# Patient Record
Sex: FEMALE | Race: BLACK OR AFRICAN AMERICAN | NOT HISPANIC OR LATINO | URBAN - METROPOLITAN AREA
[De-identification: names, ages, dates, MRNs, and addresses within clinical notes are randomized per-mention and may not be internally consistent; named-entity substitution may affect disease eponyms.]

---

## 2017-06-23 ENCOUNTER — LAB REQUISITION (OUTPATIENT)
Dept: LAB | Facility: HOSPITAL | Age: 77
End: 2017-06-23
Payer: MEDICARE

## 2017-06-23 ENCOUNTER — ALLSCRIPTS OFFICE VISIT (OUTPATIENT)
Dept: OTHER | Facility: OTHER | Age: 77
End: 2017-06-23

## 2017-06-23 DIAGNOSIS — R21 RASH AND OTHER NONSPECIFIC SKIN ERUPTION: ICD-10-CM

## 2017-06-23 PROCEDURE — 88341 IMHCHEM/IMCYTCHM EA ADD ANTB: CPT | Performed by: DERMATOLOGY

## 2017-06-23 PROCEDURE — 81342 TRG GENE REARRANGEMENT ANAL: CPT | Performed by: DERMATOLOGY

## 2017-06-23 PROCEDURE — 88342 IMHCHEM/IMCYTCHM 1ST ANTB: CPT | Performed by: DERMATOLOGY

## 2017-06-23 PROCEDURE — 88305 TISSUE EXAM BY PATHOLOGIST: CPT | Performed by: DERMATOLOGY

## 2017-06-23 PROCEDURE — 88312 SPECIAL STAINS GROUP 1: CPT | Performed by: DERMATOLOGY

## 2017-07-14 ENCOUNTER — GENERIC CONVERSION - ENCOUNTER (OUTPATIENT)
Dept: OTHER | Facility: OTHER | Age: 77
End: 2017-07-14

## 2017-08-04 ENCOUNTER — ALLSCRIPTS OFFICE VISIT (OUTPATIENT)
Dept: OTHER | Facility: OTHER | Age: 77
End: 2017-08-04

## 2017-08-21 ENCOUNTER — GENERIC CONVERSION - ENCOUNTER (OUTPATIENT)
Dept: OTHER | Facility: OTHER | Age: 77
End: 2017-08-21

## 2018-01-15 NOTE — RESULT NOTES
Verified Results  (1) TISSUE EXAM [id number] 12:41PM [facility]ia Order Number: [id number]     Test Name Result Flag Reference   LAB AP CASE REPORT (Report)     Surgical Pathology Report             Case: [id number]                   Authorizing Provider: Laney Chu MD     Collected:      06/23/2017 1241        Pathologist:      Nori Kent MD      Received:      06/26/2017 5324        Specimen:  Skin, Other, Left thigh   LAB AP FINAL DIAGNOSIS (Report)     A  Skin, Left thigh, punch biopsy:  - Mild superficial perivascular and interstitial lymphocytic infiltrate   with rare    eosinophils  See Note  -- No significant epidermotropism, tagging or microabscesses  -- T-cell gene rearrangement pending; results will be reported in an   addendum    - Melanin incontinence and basilar hyperpigmentation consistent with    post-inflammatory hyperpigmentation    - Special stain for fungus (PAS) negative  - Small incidental epidermal (infundibular) cyst       Interpretation performed at [facility], [address] [place] [address]  Electronically signed by Nori Kent MD on 7/2/2017 at 7:36 PM   LAB AP NOTE (Report)     Multiple levels are examined revealing a mild superficial perivascular and   interstitial inflammatory infiltrate composed of predominantly small   mature non-atypical lymphocytes and rare eosinophils  The epidermis is   slightly acanthotic with hyperkeratosis and minimal spongiosis  Epidermotropism, microabscesses and tagging are not seen  No necrotic   keratinocytes are appreciated  There is melanin incontinence and basilar   hyperpigmentation consistent with post-inflammatory hyperpigmentation  An   incidental small epidermal cyst is seen  Special stain for fungus is   negative   Because of the clinical concern for T-cell lymphoma,   immunostains are performed with appropriate controls revealing the   following lymphocyte staining:  CD3 predominant T-cell infiltrate; CD4>CD8 (3:1); subset of CD5 and CD7   positive T-cells; Few CD20 positive B-cells;  CD56 and CD30 negative;  CD2 and T-cell gene rearrangement pending; results will be reported in an   addendum  The histologic findings are non-specific, but given the mixed cell   infiltrate, a chronic eczematous dermatitis is favored over an evolving   T-cell lymphoproliferative disorder  To completely exclude a T-cell   lymphoproliferative disorder, T-cell gene rearrangement is pending  The   results will be reported in an addendum  LAB AP SURGICAL ADDITIONAL INFORMATION (Report)     These tests were developed and their performance characteristics   determined by [facility] or [facility]  They may not be cleared or approved by the [place]  Food and   Drug Administration  The FDA has determined that such clearance or   approval is not necessary  These tests are used for clinical purposes  They should not be regarded as investigational or for research  This   laboratory has been approved by [place] [address], designated as a high-complexity   laboratory and is qualified to perform these tests  LAB AP GROSS DESCRIPTION (Report)     A  The specimen is received in formalin, labeled with the patient's name   and hospital number, and is designated left thigh  The specimen consists   of a tan portion of skin measuring 0 4 ck with attached underlying soft   tissue to a depth of 0 3 cm  No distinct lesion is identified grossly  The apparent margin of resection is painted with green ink  The specimen   is bisected and entirely submitted in 1 cassette  Note: The estimated total formalin fixation time based upon information   provided by the submitting clinician and the standard processing schedule   is over 72 hours   AEK   LAB AP CLINICAL INFORMATION      TW Order Number: [id number]  R/O T-cell lymphoma   LAB AP ADDENDUM 1 (Report)     CD2 immunostain is performed with appropriate controls and reveals a   subset of positive T-cells  T-cell Gamma Gene Rearrangement by PCR   performed by [facility] and interpreted by Dr Penelope Burns confirms no clonal   T-cell gene rearrangement  Clinical correlation is required  If there is   continued suspicion for cutaneous T-cell lymphoma or lesions   change/progress, additional biopsy with flow cytometry should be   considered  A copy of the addendum is faxed to Dr Duane Muslim on 7/12/17   1820 hours  The complete [facility] report follows below:    T-CELL GAMMA GENE REARRANGEMENT BY PCR  Results:  T-CELL GENE REARRANGEMENT: NEGATIVE - NO CLONAL T-CELL GENE REARRANGEMENT   IS DETECTED  RESULTS  Rearrangement of the T-cell receptor gamma (TCR?? ) gene is most often   analyzed to assess clonality in T-cell lymphoproliferations  due to its relative structural simplicity  The TCR?? chain gene is located   on the short arm of chromosome 7 and has 2 constant (C), 5  joining (J), and 14 variable region (V) segments  Of these variable   regions, 11 are functional and can be rearranged in T-cell  lymphomas  This assay contains three 5? ?? primers  One amplifies DNA in the V?? family   I, which includes segments for V??2, V??3, V??4, V??5, V????7,  and V??8, also termed V??1-8  This family comprises the majority (up to 70%)   of rearranged TCR-?? chain genes  Another targets V??9  from the V?? family II (representing up to 15% of rearranged TCR-?? chain   genes), and the other primer amplifies the V??10 locus from  the V?? family III (representing up to 6% of rearranged TCR-?? chain genes)  The 3 ? ? ?primers amplify joining regions exons J1 and J2,  which are highly homologous and seen in up to 90% of rearranged TCR-??   chain genes  Collectively, this multiplexed PCR assay  assesses 90% of the V regions commonly rearranged in T-cell   lymphoproliferations  The lower limit of detection of this assay for  fresh samples is 2% for V??1-8, 1% for V??9, and 2% for V??10   The lower limit of detection of this assay for formalin-fixed, paraffinembedded  tissue is 10% for V??1-8 and 3% for V??10  A clonal T-cell population is not synonymous with a T-cell   lymphoproliferative disorder  Clonotypic expansion may occur in a variety  of neoplastic and non-neoplastic conditions, including viral infections   (EBV-related or other) autoimmune disorders, immune  deficiency syndromes, and in stem cell transplant patients  Conversely, false negatives may occur from unusual V?? family IV   rearrangements, or in small clonal T-cell populations present  below the lower limit of detection of this assay  Therefore, this result   should be interpreted in the context of all other laboratory,  histologic, and clinical information  INTERPRETIVE INFORMATION  Genomic DNA was extracted using the QIAamp DNA Kits ([employer], [facility], [place])   and amplified by fluorescent polymerase chain  reaction (PCR)  ?? ???actin serves as the internal amplification control,   confirming adequate DNA in the sample  Amplicons were   by capillary electrophoresis and detected fluorescently with an   Judys Book1 [facility] Genetic Analyzer [place], [place])  The collected data from the NITISH 3130 were analyzed by   [employer] [place], [place])  METHODOLOGY  REFERENCES  Salomon COBB, Roberto Tran, et al  Laboratory Strategies for   Efficient Handling of Paraffin-Embedded Tissues for  Molecular Detection of Clonality in Non-Hodgkin Lymphomas  Diagnostic   Molecular Pathology 12(2):79-87,2003  1   Agapito Mcmillan, et al  The Distribution of Gene Segments in   T-Cell Receptor ? ? Gene Rearrangements  Demonstrates the Need for Multiple Primers Sets  Journal of Molecular   Diagnostics 5(2):82-87, 2003  2   Kiki Hale, Rickey A  A PCR Assay for Detecting   Rearrangement of the TCR-?? gene  Molecular Diagnosis  6(2):117-124, 2001    Mitra Parkinson AW, Lam SHABAZZ, et al  Design and   standardization of PCR primers and protocols for detection of  clonal immunoglobulin and T-cell receptor gene recombinations in suspect   lymphoproliferations: report of the [facility]-2  Concerted Action [id number]  Leukemia 1712):2257-317, 2003   4   * See prior cases ([id number], 6/30/17 to [id number], 7/5/17)    MELE Bowling , M S    Hematopathologist  Addendum electronically signed by Roni Hanna MD on 7/12/2017 at 6:17 PM

## 2019-03-26 ENCOUNTER — TRANSCRIBE ORDERS (OUTPATIENT)
Dept: NON INVASIVE DIAGNOSTICS | Facility: CLINIC | Age: 79
End: 2019-03-26

## 2019-03-26 DIAGNOSIS — I10 HYPERTENSION: ICD-10-CM

## 2019-03-26 DIAGNOSIS — E78.5 HYPERLIPEMIA: Primary | ICD-10-CM

## 2019-04-08 ENCOUNTER — HOSPITAL ENCOUNTER (OUTPATIENT)
Dept: NON INVASIVE DIAGNOSTICS | Facility: CLINIC | Age: 79
Discharge: HOME | End: 2019-04-08
Payer: MEDICARE

## 2019-04-08 DIAGNOSIS — E78.5 HYPERLIPEMIA: ICD-10-CM

## 2019-04-08 DIAGNOSIS — I10 HYPERTENSION: ICD-10-CM

## 2019-04-08 PROCEDURE — 93306 TTE W/DOPPLER COMPLETE: CPT | Performed by: INTERNAL MEDICINE

## 2019-04-08 PROCEDURE — 93306 TTE W/DOPPLER COMPLETE: CPT

## 2022-03-07 ENCOUNTER — NURSING HOME VISIT (OUTPATIENT)
Dept: GERIATRICS | Facility: OTHER | Age: 82
End: 2022-03-07
Payer: MEDICARE

## 2022-03-07 ENCOUNTER — NURSING HOME VISIT (OUTPATIENT)
Dept: GERIATRICS | Facility: OTHER | Age: 82
End: 2022-03-07

## 2022-03-07 DIAGNOSIS — L98.9 SKIN LESIONS: ICD-10-CM

## 2022-03-07 DIAGNOSIS — I10 PRIMARY HYPERTENSION: ICD-10-CM

## 2022-03-07 DIAGNOSIS — B00.9 HERPETIC LESIONS: Primary | ICD-10-CM

## 2022-03-07 DIAGNOSIS — M06.09 RHEUMATOID ARTHRITIS OF MULTIPLE SITES WITH NEGATIVE RHEUMATOID FACTOR (HCC): ICD-10-CM

## 2022-03-07 DIAGNOSIS — E03.9 ACQUIRED HYPOTHYROIDISM: ICD-10-CM

## 2022-03-07 DIAGNOSIS — I26.99 PE (PULMONARY THROMBOEMBOLISM) (HCC): Primary | ICD-10-CM

## 2022-03-07 DIAGNOSIS — R26.2 AMBULATORY DYSFUNCTION: ICD-10-CM

## 2022-03-07 PROCEDURE — 99306 1ST NF CARE HIGH MDM 50: CPT | Performed by: FAMILY MEDICINE

## 2022-03-07 PROCEDURE — 99307 SBSQ NF CARE SF MDM 10: CPT | Performed by: NURSE PRACTITIONER

## 2022-03-07 RX ORDER — LOSARTAN POTASSIUM 50 MG/1
50 TABLET ORAL DAILY
COMMUNITY

## 2022-03-07 RX ORDER — SULFASALAZINE 500 MG/1
500 TABLET ORAL 3 TIMES DAILY
COMMUNITY

## 2022-03-07 RX ORDER — FOLIC ACID 1 MG/1
1 TABLET ORAL DAILY
COMMUNITY

## 2022-03-07 RX ORDER — PRAVASTATIN SODIUM 40 MG
40 TABLET ORAL DAILY
COMMUNITY

## 2022-03-07 RX ORDER — PANTOPRAZOLE SODIUM 40 MG/1
40 TABLET, DELAYED RELEASE ORAL DAILY
COMMUNITY

## 2022-03-07 RX ORDER — GABAPENTIN 300 MG/1
300 CAPSULE ORAL 2 TIMES DAILY
COMMUNITY

## 2022-03-07 RX ORDER — LEVOTHYROXINE SODIUM 0.03 MG/1
25 TABLET ORAL DAILY
COMMUNITY

## 2022-03-07 NOTE — ASSESSMENT & PLAN NOTE
· Herpetic lesions noted on left buttock  · Small clusters of closed fluid filled blisters noted  · Patient states lesions are not painful, but burn and itch    · Patient reports she has had these x 2 weeks  · Will start Acyclovir topical

## 2022-03-07 NOTE — PROGRESS NOTES
[facility]  [address] [place] [address]  [id number]  [phone] [facility]   Pos [address]  Progress Note        NAME: Ernestine Diggs  AGE: [de-identified] y o  SEX: female  :  1940  DATE OF ENCOUNTER: 3/7/2022    Chief Complaint   Patient seen and examined for follow up on chronic conditions  History of Present Illness     Ernestine Bunch is an [de-identified] y o  female patient of [facility] rehab with acute and chronic medical conditions of rheumatoid arthritis of both knees, hyperlipidemia, hypertension, and ambulatory dysfunction  The patient is being seen and examined today by request of nursing for blisters on her left buttock  Upon examination, the patient is sitting in her recliner, alert, cooperative, and in no acute distress  She denies pain, sob, chest pain, abdominal pain, fever, chills, nausea/vomiting, diarrhea/constipation, or dysuria  The patient reports she has a good appetite and is drinking an adequate amount of fluids  The patient's only complaint today is painful and burning in her left buttock x 2 weeks, while she was hospitalized  PT/OT services have evaluated and began working with patient for rehab and restorative services  The following portions of the patient's history were reviewed and updated as appropriate: allergies, current medications, past family history, past medical history, past social history, past surgical history and problem list     Review of Systems     A review of systems was performed  All negative, except as per HPI  History     Past Medical History:   Diagnosis Date    Hypertension      No past surgical history on file  No family history on file    Social History     Socioeconomic History    Marital status:      Spouse name: Not on file    Number of children: Not on file    Years of education: Not on file    Highest education level: Not on file   Occupational History    Not on file   Tobacco Use    Smoking status: Not on file    Smokeless tobacco: Not on file   Substance and Sexual Activity    Alcohol use: Not on file    Drug use: Not on file    Sexual activity: Not on file   Other Topics Concern    Not on file   Social History Narrative    Not on file     Social Determinants of Health     Financial Resource Strain: Not on file   Food Insecurity: Not on file   Transportation Needs: Not on file   Physical Activity: Not on file   Stress: Not on file   Social Connections: Not on file   Intimate Partner Violence: Not on file   Housing Stability: Not on file     Allergies   Allergen Reactions    Aceinhibitors [Ace Inhibitors] Other (See Comments)        Atenolol Other (See Comments)       Objective     Vital Signs  BP: 146/66       HR:61 T:97 4    RR:20 O2Sat:97% W:140 8  General: NAD, Well Nourished, Well Developed  Oral: Oropharynx Moist and Clear  Neck: Supple, +ROM  CV: S1, S2, normal rate, regular rhythm, no murmur appreciated  Pulmonary: Lung sounds clear to air, no wheezing, rhonchi, rales  Abdominal:BS + x4 in all quadrants, soft, no mass, no tenderness  Extremities: BLE +lymphedema, + BLE limited ROM, +Weakness  Skin: Warm, Dry, Left buttock clustered/scattered/ blistered lesions, no rash, no erythema present, no ecchymosis present  Neurological: CN 2-12 intact, PERRLA  Psych: Alert and oriented times 3, pleasant mood, no affect, good judgement    Pertinent Laboratory/Diagnostic Studies:  N/a      Current Medications     Current Medications Reviewed and updated in Nursing Home EMR  Assessment and Plan     Herpetic lesions  · Herpetic lesions noted on left buttock  · Small clusters of closed fluid filled blisters noted  · Patient states lesions are not painful, but burn and itch    · Patient reports she has had these x 2 weeks  · Will start Acyclovir topical       [address]  3/7/2022

## 2022-03-08 PROBLEM — L98.9 SKIN LESIONS: Status: ACTIVE | Noted: 2022-03-08

## 2022-03-08 NOTE — PROGRESS NOTES
[facility]  [address] [place] [address] SNF [facility]  History and Physical    NAME: Ernestine Diggs  AGE: [de-identified] y o  SEX: female [id number]    DATE OF ENCOUNTER: 3/8/2022    Code status:  CPR    Assessment and Plan     1  PE (pulmonary thromboembolism) (HCC)  - cont Eliquis 5 mg po bid    2  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)  - cont Methotrexate 2 5 mg po 8 tabs qwk  - cont Salfasalazine 500 mg po tid  - complete prednisone taper  - cont Tylenol as needed  - cont Gabapentin 300 mg po bid    3  Ambulatory dysfunction  - PT/OT ordered  - Fall precautions in place    4  Acquired hypothyroidism  - cont Levothyroxine 25 mcg po qam    5  Primary hypertension  - cont Losartan 50 mg po qd    6  Skin lesions  - appears to be molluscum contagiosum  - start Imiquimod topical 5% tid    7  Constipation:  - start dulcolax 10 mg po qd (home med)    All medications and routine orders were reviewed and updated as needed  Plan discussed with: patient and staff    Chief Complaint     Seen for admission at [address] [place] [address], a [de-identified] y/o female with PMH of HTN, HLD, Hypothyroidism, RA, Polyarthropathy, Anemia, PE on Eliquis got admitted to [facility] for pain and stiffness in both knees due to RA exacerbation  She was treated with prednisone along with methotrexate and salfasalazine  CT chest was done, which showed PE, but LE doppler was negative  She was started on Eliquis  Physical therapy evaluated and recommended rehab  She got discharged to [facility] for subacute rehab  She was seen and examined at bedside, stable  She is able to give good history  She lives at home with son and daughter in law  She uses walker at baseline  She need assistance with IADLs and some ADLs  She has Rh arthritis for years and is progressively getting worse, mostly in her knees  Says her pain is much better now, able to participate in PT   She is c/o constipation  She has a rash for 2 weeks on her buttocks, had this sometime before  The rash is not painful or itchy  Staff have no concerns at this time  HISTORY:  Past Medical History:   Diagnosis Date    Hypertension      History reviewed  No pertinent family history  Social History     Socioeconomic History    Marital status:      Spouse name: None    Number of children: None    Years of education: None    Highest education level: None   Occupational History    None   Tobacco Use    Smoking status: None    Smokeless tobacco: None   Substance and Sexual Activity    Alcohol use: None    Drug use: None    Sexual activity: None   Other Topics Concern    None   Social History Narrative    None     Social Determinants of Health     Financial Resource Strain: Not on file   Food Insecurity: Not on file   Transportation Needs: Not on file   Physical Activity: Not on file   Stress: Not on file   Social Connections: Not on file   Intimate Partner Violence: Not on file   Housing Stability: Not on file       Allergies: Allergies   Allergen Reactions    Aceinhibitors [Ace Inhibitors] Other (See Comments)        Atenolol Other (See Comments)       Review of Systems     Review of Systems   Constitutional: Positive for activity change and fatigue  Negative for fever  HENT: Positive for hearing loss and sinus pressure  Negative for dental problem and trouble swallowing  Eyes: Negative for photophobia and visual disturbance  Respiratory: Negative for cough and shortness of breath  Cardiovascular: Negative for chest pain, palpitations and leg swelling  Gastrointestinal: Positive for constipation  Negative for abdominal pain, diarrhea, nausea and vomiting  Genitourinary: Negative for difficulty urinating and dysuria  Musculoskeletal: Positive for arthralgias and gait problem  Skin: Positive for rash  Neurological: Positive for weakness   Negative for dizziness and headaches  All other systems reviewed and are negative  As in HPI  Medications and orders     All medications reviewed and updated in Nursing Home EMR  Objective     Vitals: T: 97 6, P: 66, R: 16, BP: 115/62, 98% on RA, Wt: 140 8 lbs    Physical Exam  Vitals and nursing note reviewed  Constitutional:       General: She is not in acute distress  Appearance: Normal appearance  She is well-developed  She is not diaphoretic  HENT:      Head: Normocephalic and atraumatic  Nose: Nose normal       Mouth/Throat:      Mouth: Mucous membranes are moist       Pharynx: Oropharynx is clear  No oropharyngeal exudate  Eyes:      General: No scleral icterus  Right eye: No discharge  Left eye: No discharge  Extraocular Movements: Extraocular movements intact  Conjunctiva/sclera: Conjunctivae normal    Cardiovascular:      Rate and Rhythm: Normal rate and regular rhythm  Heart sounds: Normal heart sounds  No murmur heard  Pulmonary:      Effort: Pulmonary effort is normal  No respiratory distress  Breath sounds: Normal breath sounds  No wheezing  Chest:      Chest wall: No tenderness  Abdominal:      General: Bowel sounds are normal       Palpations: Abdomen is soft  Tenderness: There is no abdominal tenderness  There is no guarding or rebound  Musculoskeletal:         General: No tenderness or deformity  Normal range of motion  Cervical back: Normal range of motion and neck supple  Right lower leg: Edema present  Left lower leg: Edema present  Skin:     General: Skin is warm and dry  Comments: Hard bumps on left buttock   Neurological:      Mental Status: She is alert and oriented to person, place, and time  Cranial Nerves: No cranial nerve deficit  Psychiatric:         Mood and Affect: Mood normal          Behavior: Behavior normal          Pertinent Laboratory/Diagnostic Studies:    The following labs/studies were reviewed please see chart or hospital paperwork for details  Ref Range & Units 2/27/22 1240    Hemoglobin 11 5 - 14 5 g/dL 11 1 Low     Hematocrit 35 0 - 43 0 % 32 8 Low     WBC 4 0 - 10 0 thou/cmm 3 7 Low     RBC 3 70 - 4 70 mill/cmm 3 60 Low     Platelet Count 966 - 350 thou/cmm 248    MPV 7 5 - 11 3 fL 7 3 Low     MCV 80 - 100 fL 91    MCH 26 0 - 34 0 pg 30 9    MCHC 32 0 - 37 0 g/dL 33 9    RDW 12 0 - 16 0 % 15 9    Differential Type  AUTO    Absolute Neutrophils 1 8 - 7 8 thou/cmm 2 7    Absolute Lymphocytes 1 0 - 3 0 thou/cmm 0 7 Low     Absolute Monocytes 0 3 - 1 0 thou/cmm 0 4    Absolute Eosinophils 0 0 - 0 5 thou/cmm 0 0    Absolute Basophils 0 0 - 0 1 thou/cmm 0 0    Neutrophils % 72    Lymphocytes % 18    Monocytes % 9    Eosinophils % 1    Basophils % 0      Ref Range & Units 2/27/22 1240    Glucose 70 - 100 mg/dL 145 High     BUN 7 - 25 mg/dL 24    Creatinine 0 60 - 1 20 mg/dL 0 85    Sodium 136 - 145 mmol/L 137    Potassium 3 5 - 5 1 mmol/L 3 5    Chloride 100 - 108 mmol/L 99 Low     Carbon Dioxide 21 - 31 mmol/L 31    Calcium 8 6 - 10 2 mg/dL 9 1    Anion Gap 4 - 18 7    eGFR, Non-African American >60 GFR not calculated due to lack of steady state      eGFR,  >60 GFR not calculated due to lack of steady state         - Counseling Documentation: patient was counseled regarding: prognosis

## 2022-03-10 ENCOUNTER — NURSING HOME VISIT (OUTPATIENT)
Dept: GERIATRICS | Facility: OTHER | Age: 82
End: 2022-03-10
Payer: MEDICARE

## 2022-03-10 DIAGNOSIS — R26.2 AMBULATORY DYSFUNCTION: ICD-10-CM

## 2022-03-10 DIAGNOSIS — M06.09 RHEUMATOID ARTHRITIS OF MULTIPLE SITES WITH NEGATIVE RHEUMATOID FACTOR (HCC): Primary | ICD-10-CM

## 2022-03-10 DIAGNOSIS — I10 PRIMARY HYPERTENSION: ICD-10-CM

## 2022-03-10 DIAGNOSIS — B08.1 MOLLUSCUM CONTAGIOSUM: ICD-10-CM

## 2022-03-10 PROCEDURE — 99309 SBSQ NF CARE MODERATE MDM 30: CPT | Performed by: NURSE PRACTITIONER

## 2022-03-10 NOTE — ASSESSMENT & PLAN NOTE
· BP today first check 184/96, second reading 146/76  · Continue to monitor BP closely  · Continue losartan  · Avoid hypotension  · Will continue to monitor BMP

## 2022-03-10 NOTE — ASSESSMENT & PLAN NOTE
 Maintain fall and safety precautions   Encourage use of call bell   Continue PT/OT services   Assist with transfers, mobility, and ADLs

## 2022-03-10 NOTE — PROGRESS NOTES
[address] [facility]  [address] [facility] [address]  [phone] [facility]   [address]  Progress Note        NAME: Ernestine Diggs  AGE: [de-identified] y o  SEX: female  :  1940  DATE OF ENCOUNTER: 3/10/2022    Chief Complaint   Patient seen and examined for follow up on chronic conditions  History of Present Illness     Ernestine Mckeon is an [de-identified] y o  female patient of [facility] rehab with acute and chronic medical conditions of rheumatoid arthritis of both knees, hyperlipidemia, hypertension, and ambulatory dysfunction      The patient is being seen and examined today for acute and chronic conditions  Upon examination, the patient is sitting in her recliner, alert, cooperative, and in no acute distress  She denies  sob, chest pain, abdominal pain, fever, chills, nausea/vomiting, diarrhea/constipation, or dysuria  The patient reports she has a good appetite and is drinking an adequate amount of fluids  The patient's only complaint today is intermittent bilateral knee pain that she reports has improved since starting physical therapy  She states she has no pain with sitting or walking, but has initial pain upon standing  Per PT/OT the patient's ambulatory status is 10 feet with RW with mod assist of 2  The patient's mobility status is bed mobility and transfers requires mod assist of 2  The patient's ADL status is upper body set-up mod assist, lower body and toileting are dependent  The following portions of the patient's history were reviewed and updated as appropriate: allergies, current medications, past family history, past medical history, past social history, past surgical history and problem list     Review of Systems     A review of systems was performed  All negative, except as per HPI  History     Past Medical History:   Diagnosis Date    Hypertension     Molluscum contagiosum 3/10/2022     No past surgical history on file  No family history on file    Social History     Socioeconomic History    Marital status:      Spouse name: Not on file    Number of children: Not on file    Years of education: Not on file    Highest education level: Not on file   Occupational History    Not on file   Tobacco Use    Smoking status: Not on file    Smokeless tobacco: Not on file   Substance and Sexual Activity    Alcohol use: Not on file    Drug use: Not on file    Sexual activity: Not on file   Other Topics Concern    Not on file   Social History Narrative    Not on file     Social Determinants of Health     Financial Resource Strain: Not on file   Food Insecurity: Not on file   Transportation Needs: Not on file   Physical Activity: Not on file   Stress: Not on file   Social Connections: Not on file   Intimate Partner Violence: Not on file   Housing Stability: Not on file     Allergies   Allergen Reactions    Aceinhibitors [Ace Inhibitors] Other (See Comments)        Atenolol Other (See Comments)              Objective     Vital Signs  BP: 146/76            HR:90        T:98 2   RR:20           O2Sat:96%      W:140 8  General: NAD, Well Nourished, Well Developed  Oral: Oropharynx Moist and Clear  Neck: Supple, +ROM  CV: S1, S2, normal rate, regular rhythm, no murmur appreciated  Pulmonary: Lung sounds clear to air, no wheezing, rhonchi, rales  Abdominal:BS + x4 in all quadrants, soft, no mass, no tenderness  Extremities: BLE +lymphedema, + BLE limited ROM, +Weakness  Skin: Warm, Dry, Left buttock clustered/scattered/ Molluscum contagiosim lesions, no rash, no erythema present, no ecchymosis present  Neurological: CN 2-12 intact, PERRLA  Psych: Alert and oriented times 3, pleasant mood, no affect, good judgement     Pertinent Laboratory/Diagnostic Studies:  CBC WITH DIFF  HEMOGLOBIN 9 6 g/dL 11 5-14 5 L Final  HEMATOCRIT 28 6 % 35 0-43 0 L Final  WBC 3 1 thou/cmm 4 0-10 0 L Final  RBC 3 14 mill/cmm 3 70-4 70 L Final  PLATELET COUNT 224 thou/cmm 140-350 Final  MPV 7 3 fL 7 5-11 3 L Final  MCV 91 fL  Final  MCH 30 5 pg 26 0-34 0 Final  MCHC 33 5 g/dL 32 0-37 0 Final  RDW 15 7 % 12 0-16 0 Final  DIFFERENTIAL TYPE AUTO Final  ABSOLUTE NEUT 0 9 thou/cmm 1 8-7 8 L Final  ABSOLUTE LYMPH 1 7 thou/cmm 1 0-3 0 Final  ABSOLUTE MONO 0 5 thou/cmm 0 3-1 0 Final  ABSOLUTE EOS 0 1 thou/cmm 0 0-0 5 Final  ABSOLUTE BASO 0 0 thou/cmm 0 0-0 1 Final  NEUTROPHILS 29 % Final  LYMPHOCYTES 53 % Final  MONOCYTES 15 % Final  EOSINOPHILS 2 % Final  BASOPHILS 1 % Final  COMP METAB PANEL  GLUCOSE 79 mg/dL 65-99 Final  BUN 18 mg/dL 7-25 Final  CREATININE 0 58 mg/dL 0 40-1 10 Final  SODIUM 143 mmol/L 135-145 Final  POTASSIUM 4 0 mmol/L 3 5-5 2 Final  CHLORIDE 108 mmol/L 100-109 Final  CARBON DIOXIDE 29 mmol/L 23-31 Final  CALCIUM 9 0 mg/dL 8 5-10 1 Final  ALKALINE PHOSPHATASE 53 U/L  Final  ALBUMIN 2 7 g/dL 3 5-4 8 L Final  BILIRUBIN,TOTAL 0 3 mg/dL 0 2-1 0 Final  Use of this assay is not recommended for patients undergoing treatment   with eltrombopag due to the potential for falsely elevated results  PROTEIN, TOTAL 5 9 g/dL 6 3-8 3 L Final  AST 51 U/L <41 H Final  ALT 30 U/L <56 Final  ANION GAP 6 3-11 Final  eGFRcr 91 >59 Final    Current Medications     Current Medications Reviewed and updated in Nursing Home EMR      Assessment and Plan     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)  · Continue methotrexate, sulfasalazine, Tylenol, gabapentin, and prednisone taper  · Continue to encourage repositioning, mobility with assist  · Continue PT OT services  · Follow-up with rheumatology    Ambulatory dysfunction   Maintain fall and safety precautions   Encourage use of call bell   Continue PT/OT services   Assist with transfers, mobility, and ADLs      Primary hypertension  · BP today first check 184/96, second reading 146/76  · Continue to monitor BP closely  · Continue losartan  · Avoid hypotension  · Will continue to monitor BMP      Molluscum contagiosum  · Aldara cream 5% started Monday Wednesday Friday, HS to left buttock lesions x4 weeks  · Continue to monitor      Nicole Segura [address]  3/10/2022

## 2022-03-10 NOTE — ASSESSMENT & PLAN NOTE
· Aldara cream 5% started Monday Wednesday Friday, HS to left buttock lesions x4 weeks  · Continue to monitor

## 2022-03-10 NOTE — ASSESSMENT & PLAN NOTE
· Continue methotrexate, sulfasalazine, Tylenol, gabapentin, and prednisone taper  · Continue to encourage repositioning, mobility with assist  · Continue PT OT services  · Follow-up with rheumatology

## 2022-03-11 ENCOUNTER — NURSING HOME VISIT (OUTPATIENT)
Dept: GERIATRICS | Facility: OTHER | Age: 82
End: 2022-03-11
Payer: MEDICARE

## 2022-03-11 DIAGNOSIS — I10 PRIMARY HYPERTENSION: ICD-10-CM

## 2022-03-11 DIAGNOSIS — R26.2 AMBULATORY DYSFUNCTION: ICD-10-CM

## 2022-03-11 DIAGNOSIS — R09.81 NASAL CONGESTION: ICD-10-CM

## 2022-03-11 DIAGNOSIS — B08.1 MOLLUSCUM CONTAGIOSUM: ICD-10-CM

## 2022-03-11 DIAGNOSIS — M06.09 RHEUMATOID ARTHRITIS OF MULTIPLE SITES WITH NEGATIVE RHEUMATOID FACTOR (HCC): Primary | ICD-10-CM

## 2022-03-11 PROCEDURE — 99309 SBSQ NF CARE MODERATE MDM 30: CPT | Performed by: NURSE PRACTITIONER

## 2022-03-11 NOTE — ASSESSMENT & PLAN NOTE
· Continue to monitor BP closely  · Continue losartan  · Avoid hypotension  · Will continue to monitor BMP

## 2022-03-11 NOTE — ASSESSMENT & PLAN NOTE
· Continue PT/OT services  · Continue methotrexate, sulfasalazine, Tylenol, gabapentin, and prednisone taper  · Continue to encourage repositioning and mobility with assist  · Follow-up with Rheumatology outpatient

## 2022-03-11 NOTE — PROGRESS NOTES
[address] [phone] [facility]   [address]  Progress Note        NAME: Ernestine iDggs  AGE: [de-identified] y o  SEX: female  :  1940  DATE OF ENCOUNTER: 3/11/2022    Chief Complaint   Patient seen and examined for follow up on chronic conditions  History of Present Illness     Ernestine Alva is an [de-identified] y o  female patient of [facility] rehab with acute and chronic medical conditions of rheumatoid arthritis of both knees, hyperlipidemia, hypertension, and ambulatory dysfunction      The patient is being seen and examined today for acute and chronic conditions  Upon examination, the patient is sitting in her recliner, alert, cooperative, and in no acute distress  She denies  sob, chest pain, abdominal pain, fever, chills, nausea/vomiting, diarrhea/constipation, or dysuria  The patient reports she has a good appetite and is drinking an adequate amount of fluids  The patient's only complaint today is intermittent bilateral knee pain that she reports has improved since starting physical therapy  She states she has no pain with sitting or walking, but has initial pain upon standing  The patient also complains of having some nasal congestion that she believes is related to allergies      Per PT/OT the patient's ambulatory status is 10 feet with RW with mod assist of 2  The patient's mobility status is bed mobility and transfers requires mod assist of 2  The patient's ADL status is upper body set-up mod assist, lower body and toileting are dependent  The following portions of the patient's history were reviewed and updated as appropriate: allergies, current medications, past family history, past medical history, past social history, past surgical history and problem list     Review of Systems     A review of systems was performed  All negative, except as per HPI      History     Past Medical History:   Diagnosis Date    Hypertension     Molluscum contagiosum 3/10/2022    Nasal congestion 3/11/2022     No past surgical history on file  No family history on file  Social History     Socioeconomic History    Marital status:      Spouse name: Not on file    Number of children: Not on file    Years of education: Not on file    Highest education level: Not on file   Occupational History    Not on file   Tobacco Use    Smoking status: Not on file    Smokeless tobacco: Not on file   Substance and Sexual Activity    Alcohol use: Not on file    Drug use: Not on file    Sexual activity: Not on file   Other Topics Concern    Not on file   Social History Narrative    Not on file     Social Determinants of Health     Financial Resource Strain: Not on file   Food Insecurity: Not on file   Transportation Needs: Not on file   Physical Activity: Not on file   Stress: Not on file   Social Connections: Not on file   Intimate Partner Violence: Not on file   Housing Stability: Not on file     Allergies   Allergen Reactions    Aceinhibitors [Ace Inhibitors] Other (See Comments)        Atenolol Other (See Comments)              Objective     Vital Signs  BP: 174/78            HR:54        T:97 8   RR:18          O2Sat:96%      W:140 8  General: NAD, Well Nourished, Well Developed  Oral: Oropharynx Moist and Clear  Neck: Supple, +ROM  CV: S1, S2, normal rate, regular rhythm, no murmur appreciated  Pulmonary: Lung sounds clear to air, no wheezing, rhonchi, rales  Abdominal:BS + x4 in all quadrants, soft, no mass, no tenderness  Extremities: BLE +lymphedema, + BLE limited ROM, +Weakness  Skin: Warm, Dry, Left buttock clustered/scattered/ Molluscum contagiosim lesions, no rash, no erythema present, no ecchymosis present  Neurological: CN 2-12 intact, PERRLA  Psych: Alert and oriented times 3, pleasant mood, no affect, good judgement     Pertinent Laboratory/Diagnostic Studies:  CBC WITH DIFF  HEMOGLOBIN 9 6 g/dL 11 5-14 5 L Final  HEMATOCRIT 28 6 % 35 0-43 0 L Final  WBC 3 1 thou/cmm 4 0-10 0 L Final  RBC 3 14 mill/cmm 3 70-4 70 L Final  PLATELET COUNT 224 thou/cmm 140-350 Final  MPV 7 3 fL 7 5-11 3 L Final  MCV 91 fL  Final  MCH 30 5 pg 26 0-34 0 Final  MCHC 33 5 g/dL 32 0-37 0 Final  RDW 15 7 % 12 0-16 0 Final  DIFFERENTIAL TYPE AUTO Final  ABSOLUTE NEUT 0 9 thou/cmm 1 8-7 8 L Final  ABSOLUTE LYMPH 1 7 thou/cmm 1 0-3 0 Final  ABSOLUTE MONO 0 5 thou/cmm 0 3-1 0 Final  ABSOLUTE EOS 0 1 thou/cmm 0 0-0 5 Final  ABSOLUTE BASO 0 0 thou/cmm 0 0-0 1 Final  NEUTROPHILS 29 % Final  LYMPHOCYTES 53 % Final  MONOCYTES 15 % Final  EOSINOPHILS 2 % Final  BASOPHILS 1 % Final  COMP METAB PANEL  GLUCOSE 79 mg/dL 65-99 Final  BUN 18 mg/dL 7-25 Final  CREATININE 0 58 mg/dL 0 40-1 10 Final  SODIUM 143 mmol/L 135-145 Final  POTASSIUM 4 0 mmol/L 3 5-5 2 Final  CHLORIDE 108 mmol/L 100-109 Final  CARBON DIOXIDE 29 mmol/L 23-31 Final  CALCIUM 9 0 mg/dL 8 5-10 1 Final  ALKALINE PHOSPHATASE 53 U/L  Final  ALBUMIN 2 7 g/dL 3 5-4 8 L Final  BILIRUBIN,TOTAL 0 3 mg/dL 0 2-1 0 Final  Use of this assay is not recommended for patients undergoing treatment   with eltrombopag due to the potential for falsely elevated results  PROTEIN, TOTAL 5 9 g/dL 6 3-8 3 L Final  AST 51 U/L <41 H Final  ALT 30 U/L <56 Final  ANION GAP 6 3-11 Final  eGFRcr 91 >59 Final    Current Medications     Current Medications Reviewed and updated in Nursing Home EMR      Assessment and Plan     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)  · Continue PT/OT services  · Continue methotrexate, sulfasalazine, Tylenol, gabapentin, and prednisone taper  · Continue to encourage repositioning and mobility with assist  · Follow-up with Rheumatology outpatient    Ambulatory dysfunction   Maintain fall and safety precautions   Encourage use of call bell   Continue PT/OT services   Assist with transfers, mobility, and ADLs      Primary hypertension  · Continue to monitor BP closely  · Continue losartan  · Avoid hypotension  · Will continue to monitor BMP      Molluscum contagiosum  · Continue Aldara cream  · Continue to monitor lesions    Nasal congestion  · Patient reports allergies  · Will start [facility]  3/11/2022

## 2022-03-14 ENCOUNTER — NURSING HOME VISIT (OUTPATIENT)
Dept: GERIATRICS | Facility: OTHER | Age: 82
End: 2022-03-14
Payer: MEDICARE

## 2022-03-14 DIAGNOSIS — R26.2 AMBULATORY DYSFUNCTION: ICD-10-CM

## 2022-03-14 DIAGNOSIS — I10 PRIMARY HYPERTENSION: ICD-10-CM

## 2022-03-14 DIAGNOSIS — M06.09 RHEUMATOID ARTHRITIS OF MULTIPLE SITES WITH NEGATIVE RHEUMATOID FACTOR (HCC): Primary | ICD-10-CM

## 2022-03-14 DIAGNOSIS — B08.1 MOLLUSCUM CONTAGIOSUM: ICD-10-CM

## 2022-03-14 PROCEDURE — 99309 SBSQ NF CARE MODERATE MDM 30: CPT | Performed by: NURSE PRACTITIONER

## 2022-03-14 NOTE — ASSESSMENT & PLAN NOTE
· BP remains elevated over the past week  · Will increase Losartan to 75 mg p o   Daily  · Avoid hypotension  · Will continue to monitor BMP

## 2022-03-14 NOTE — ASSESSMENT & PLAN NOTE
· Continue PT OT services  · Continue all arthritis medications  · Continue to encourage daily ambulation and mobility with assist  · Encourage repositioning  · Follow-up with rheumatology as outpatient

## 2022-03-14 NOTE — PROGRESS NOTES
[address] [facility] [place]  [address]  [phone] [facility]   [address]  Progress Note        NAME: Ernestine Diggs  AGE: [de-identified] y o  SEX: female  :  1940  DATE OF ENCOUNTER: 3/14/2022    Chief Complaint   Patient seen and examined for follow up on chronic conditions  History of Present Illness     Ernestine John is an [de-identified] y o  female patient of [facility] rehab with acute and chronic medical conditions of rheumatoid arthritis of both knees, hyperlipidemia, hypertension, and ambulatory dysfunction      The patient is being seen and examined today for acute and chronic conditions  Upon examination, the patient is sitting in her recliner, alert, cooperative, and in no acute distress  She denies  sob, chest pain, abdominal pain, fever, chills, nausea/vomiting, diarrhea/constipation, or dysuria  The patient reports she has a good appetite and is drinking an adequate amount of fluids  The patient's only complaint today is intermittent bilateral knee pain that she reports has improved since starting physical therapy  She states she has no pain with sitting or walking, but has initial pain upon standing        Per PT/OT the patient's ambulatory status is 10 feet with RW with mod assist of 2  The patient's mobility status is bed mobility and transfers requires mod assist of 2  The patient's ADL status is upper body set-up mod assist, lower body and toileting are dependent  The following portions of the patient's history were reviewed and updated as appropriate: allergies, current medications, past family history, past medical history, past social history, past surgical history and problem list     Review of Systems     A review of systems was performed  All negative, except as per HPI  History     Past Medical History:   Diagnosis Date    Hypertension     Molluscum contagiosum 3/10/2022    Nasal congestion 3/11/2022     No past surgical history on file    No family history on file   Social History     Socioeconomic History    Marital status:      Spouse name: Not on file    Number of children: Not on file    Years of education: Not on file    Highest education level: Not on file   Occupational History    Not on file   Tobacco Use    Smoking status: Not on file    Smokeless tobacco: Not on file   Substance and Sexual Activity    Alcohol use: Not on file    Drug use: Not on file    Sexual activity: Not on file   Other Topics Concern    Not on file   Social History Narrative    Not on file     Social Determinants of Health     Financial Resource Strain: Not on file   Food Insecurity: Not on file   Transportation Needs: Not on file   Physical Activity: Not on file   Stress: Not on file   Social Connections: Not on file   Intimate Partner Violence: Not on file   Housing Stability: Not on file     Allergies   Allergen Reactions    Aceinhibitors [Ace Inhibitors] Other (See Comments)        Atenolol Other (See Comments)              Objective     Vital Signs  BP: 176/75          HR:68        T:97 9   RR:20          O2Sat:97%      W:135 4  General: NAD, Well Nourished, Well Developed  Oral: Oropharynx Moist and Clear  Neck: Supple, +ROM  CV: S1, S2, normal rate, regular rhythm, no murmur appreciated  Pulmonary: Lung sounds clear to air, no wheezing, rhonchi, rales  Abdominal:BS + x4 in all quadrants, soft, no mass, no tenderness  Extremities: BLE +lymphedema, + BLE limited ROM, +Weakness  Skin: Warm, Dry, Left buttock clustered/scattered/ Molluscum contagiosim lesions, no rash, no erythema present, no ecchymosis present  Neurological: CN 2-12 intact, PERRLA  Psych: Alert and oriented times 3, pleasant mood, no affect, good judgement     Pertinent Laboratory/Diagnostic Studies:  CBC WITH DIFF  HEMOGLOBIN 9 6 g/dL 11 5-14 5 L Final  HEMATOCRIT 28 6 % 35 0-43 0 L Final  WBC 3 1 thou/cmm 4 0-10 0 L Final  RBC 3 14 mill/cmm 3 70-4 70 L Final  PLATELET COUNT 209 thou/cmm 140-350 Final  MPV 7 3 fL 7 5-11 3 L Final  MCV 91 fL  Final  MCH 30 5 pg 26 0-34 0 Final  MCHC 33 5 g/dL 32 0-37 0 Final  RDW 15 7 % 12 0-16 0 Final  DIFFERENTIAL TYPE AUTO Final  ABSOLUTE NEUT 0 9 thou/cmm 1 8-7 8 L Final  ABSOLUTE LYMPH 1 7 thou/cmm 1 0-3 0 Final  ABSOLUTE MONO 0 5 thou/cmm 0 3-1 0 Final  ABSOLUTE EOS 0 1 thou/cmm 0 0-0 5 Final  ABSOLUTE BASO 0 0 thou/cmm 0 0-0 1 Final  NEUTROPHILS 29 % Final  LYMPHOCYTES 53 % Final  MONOCYTES 15 % Final  EOSINOPHILS 2 % Final  BASOPHILS 1 % Final  COMP METAB PANEL  GLUCOSE 79 mg/dL 65-99 Final  BUN 18 mg/dL 7-25 Final  CREATININE 0 58 mg/dL 0 40-1 10 Final  SODIUM 143 mmol/L 135-145 Final  POTASSIUM 4 0 mmol/L 3 5-5 2 Final  CHLORIDE 108 mmol/L 100-109 Final  CARBON DIOXIDE 29 mmol/L 23-31 Final  CALCIUM 9 0 mg/dL 8 5-10 1 Final  ALKALINE PHOSPHATASE 53 U/L  Final  ALBUMIN 2 7 g/dL 3 5-4 8 L Final  BILIRUBIN,TOTAL 0 3 mg/dL 0 2-1 0 Final  Use of this assay is not recommended for patients undergoing treatment   with eltrombopag due to the potential for falsely elevated results  PROTEIN, TOTAL 5 9 g/dL 6 3-8 3 L Final  AST 51 U/L <41 H Final  ALT 30 U/L <56 Final  ANION GAP 6 3-11 Final  eGFRcr 91 >59 Final    Current Medications     Current Medications Reviewed and updated in Nursing Home EMR  Assessment and Plan     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)  · Continue PT OT services  · Continue all arthritis medications  · Continue to encourage daily ambulation and mobility with assist  · Encourage repositioning  · Follow-up with rheumatology as outpatient    Ambulatory dysfunction   Maintain fall and safety precautions   Encourage use of call bell   Continue PT/OT services   Assist with transfers, mobility, and ADLs      Primary hypertension  · BP remains elevated over the past week  · Will increase Losartan to 75 mg p o   Daily  · Avoid hypotension  · Will continue to monitor BMP      Molluscum contagiosum  · Continue Aldara cream  · Continue to monitor lesions      [address] [facility]  Geriatric Medicine  3/14/2022

## 2022-03-16 ENCOUNTER — NURSING HOME VISIT (OUTPATIENT)
Dept: GERIATRICS | Facility: OTHER | Age: 82
End: 2022-03-16
Payer: MEDICARE

## 2022-03-16 DIAGNOSIS — M06.09 RHEUMATOID ARTHRITIS OF MULTIPLE SITES WITH NEGATIVE RHEUMATOID FACTOR (HCC): Primary | ICD-10-CM

## 2022-03-16 DIAGNOSIS — B08.1 MOLLUSCUM CONTAGIOSUM: ICD-10-CM

## 2022-03-16 DIAGNOSIS — I10 PRIMARY HYPERTENSION: ICD-10-CM

## 2022-03-16 DIAGNOSIS — R26.2 AMBULATORY DYSFUNCTION: ICD-10-CM

## 2022-03-16 PROCEDURE — 99309 SBSQ NF CARE MODERATE MDM 30: CPT | Performed by: NURSE PRACTITIONER

## 2022-03-16 NOTE — PROGRESS NOTES
[address] [address] [phone] [facility]   [address]  Progress Note        NAME: Ernestine Diggs  AGE: [de-identified] y o  SEX: female  :  1940  DATE OF ENCOUNTER: 3/16/2022    Chief Complaint   Patient seen and examined for follow up on chronic conditions  History of Present Illness     Ernestine Weber is an [de-identified] y o  female patient of [facility] rehab with acute and chronic medical conditions of rheumatoid arthritis of both knees, hyperlipidemia, hypertension, and ambulatory dysfunction      The patient is being seen and examined today for acute and chronic conditions  Upon examination, the patient is sitting in her recliner, alert, cooperative, and in no acute distress  She denies  sob, chest pain, abdominal pain, fever, chills, nausea/vomiting, diarrhea/constipation, or dysuria  The patient reports she has a good appetite and is drinking an adequate amount of fluids  The patient's only complaint today is intermittent bilateral knee pain that she reports has improved since starting physical therapy  She states she has no pain with sitting or walking, but has initial pain upon standing        Per PT/OT the patient's ambulatory status is 130 feet with RW with contact guard  The patient's mobility status is bed mobility and transfers requires mod assist of 1  The patient's ADL status is upper body set-up mod assist, lower body Min/mod assist, and toileting is moderate independence  The following portions of the patient's history were reviewed and updated as appropriate: allergies, current medications, past family history, past medical history, past social history, past surgical history and problem list     Review of Systems     A review of systems was performed  All negative, except as per HPI  History     Past Medical History:   Diagnosis Date    Hypertension     Molluscum contagiosum 3/10/2022    Nasal congestion 3/11/2022     No past surgical history on file    No family history on file  Social History     Socioeconomic History    Marital status:      Spouse name: Not on file    Number of children: Not on file    Years of education: Not on file    Highest education level: Not on file   Occupational History    Not on file   Tobacco Use    Smoking status: Not on file    Smokeless tobacco: Not on file   Substance and Sexual Activity    Alcohol use: Not on file    Drug use: Not on file    Sexual activity: Not on file   Other Topics Concern    Not on file   Social History Narrative    Not on file     Social Determinants of Health     Financial Resource Strain: Not on file   Food Insecurity: Not on file   Transportation Needs: Not on file   Physical Activity: Not on file   Stress: Not on file   Social Connections: Not on file   Intimate Partner Violence: Not on file   Housing Stability: Not on file     Allergies   Allergen Reactions    Aceinhibitors [Ace Inhibitors] Other (See Comments)        Atenolol Other (See Comments)              Objective     Vital Signs  BP: 106/46 HR:63        T:97 3   RR:18          O2Sat:98%      W:136 2  General: NAD, Well Nourished, Well Developed  Oral: Oropharynx Moist and Clear  Neck: Supple, +ROM  CV: S1, S2, normal rate, regular rhythm, no murmur appreciated  Pulmonary: Lung sounds clear to air, no wheezing, rhonchi, rales  Abdominal:BS + x4 in all quadrants, soft, no mass, no tenderness  Extremities: BLE +lymphedema, + BLE limited ROM, +Weakness  Skin: Warm, Dry, Left buttock clustered/scattered/ Molluscum contagiosim lesions, no rash, no erythema present, no ecchymosis present  Neurological: CN 2-12 intact, PERRLA  Psych: Alert and oriented times 3, pleasant mood, no affect, good judgement    Pertinent Laboratory/Diagnostic Studies:  CBC WITH DIFF  HEMOGLOBIN 9 6 g/dL 11 5-14 5 L Final  HEMATOCRIT 28 6 % 35 0-43 0 L Final  WBC 3 1 thou/cmm 4 0-10 0 L Final  RBC 3 14 mill/cmm 3 70-4 70 L Final  PLATELET COUNT 753 thou/cmm 140-350 Final  MPV 7 3 fL 7 5-11 3 L Final  MCV 91 fL  Final  MCH 30 5 pg 26 0-34 0 Final  MCHC 33 5 g/dL 32 0-37 0 Final  RDW 15 7 % 12 0-16 0 Final  DIFFERENTIAL TYPE AUTO Final  ABSOLUTE NEUT 0 9 thou/cmm 1 8-7 8 L Final  ABSOLUTE LYMPH 1 7 thou/cmm 1 0-3 0 Final  ABSOLUTE MONO 0 5 thou/cmm 0 3-1 0 Final  ABSOLUTE EOS 0 1 thou/cmm 0 0-0 5 Final  ABSOLUTE BASO 0 0 thou/cmm 0 0-0 1 Final  NEUTROPHILS 29 % Final  LYMPHOCYTES 53 % Final  MONOCYTES 15 % Final  EOSINOPHILS 2 % Final  BASOPHILS 1 % Final  COMP METAB PANEL  GLUCOSE 79 mg/dL 65-99 Final  BUN 18 mg/dL 7-25 Final  CREATININE 0 58 mg/dL 0 40-1 10 Final  SODIUM 143 mmol/L 135-145 Final  POTASSIUM 4 0 mmol/L 3 5-5 2 Final  CHLORIDE 108 mmol/L 100-109 Final  CARBON DIOXIDE 29 mmol/L 23-31 Final  CALCIUM 9 0 mg/dL 8 5-10 1 Final  ALKALINE PHOSPHATASE 53 U/L  Final  ALBUMIN 2 7 g/dL 3 5-4 8 L Final  BILIRUBIN,TOTAL 0 3 mg/dL 0 2-1 0 Final  Use of this assay is not recommended for patients undergoing treatment   with eltrombopag due to the potential for falsely elevated results  PROTEIN, TOTAL 5 9 g/dL 6 3-8 3 L Final  AST 51 U/L <41 H Final  ALT 30 U/L <56 Final  ANION GAP 6 3-11 Final  eGFRcr 91 >59 Final      Current Medications     Current Medications Reviewed and updated in Nursing Home EMR      Assessment and Plan     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)  · Continue PT/OT services  · Continue all arthritis medication  · Encourage daily ambulation and mobility with assistance  · Encourage repositioning  · Follow-up with rheumatology as outpatient    Ambulatory dysfunction   Maintain fall and safety precautions   Encourage use of call bell   Continue PT/OT services   Assist with transfers, mobility, and ADLs      Primary hypertension  · BP today 106/46  · Continue to monitor BP closely  · C change made to Losartan 75 mg po daily   · Avoid hypotension  · Will continue to monitor BMP      Molluscum contagiosum  · Continue Aldara cream  · Continue to monitor  lesions      [address] [facility]  Geriatric Medicine  3/16/2022

## 2022-03-16 NOTE — ASSESSMENT & PLAN NOTE
· Continue PT/OT services  · Continue all arthritis medication  · Encourage daily ambulation and mobility with assistance  · Encourage repositioning  · Follow-up with rheumatology as outpatient

## 2022-03-16 NOTE — ASSESSMENT & PLAN NOTE
· BP today 106/46  · Continue to monitor BP closely  · C change made to Losartan 75 mg po daily   · Avoid hypotension  · Will continue to monitor BMP

## 2022-03-18 ENCOUNTER — NURSING HOME VISIT (OUTPATIENT)
Dept: GERIATRICS | Facility: OTHER | Age: 82
End: 2022-03-18
Payer: MEDICARE

## 2022-03-18 DIAGNOSIS — R26.2 AMBULATORY DYSFUNCTION: ICD-10-CM

## 2022-03-18 DIAGNOSIS — M06.09 RHEUMATOID ARTHRITIS OF MULTIPLE SITES WITH NEGATIVE RHEUMATOID FACTOR (HCC): Primary | ICD-10-CM

## 2022-03-18 DIAGNOSIS — I10 PRIMARY HYPERTENSION: ICD-10-CM

## 2022-03-18 PROBLEM — G47.00 INSOMNIA: Status: ACTIVE | Noted: 2022-03-18

## 2022-03-18 PROCEDURE — 99309 SBSQ NF CARE MODERATE MDM 30: CPT | Performed by: NURSE PRACTITIONER

## 2022-03-18 NOTE — ASSESSMENT & PLAN NOTE
· Patient reports trouble with sleeping last night  · Encourage adequate sleep hygiene, sleep/wake cycle  · Will start melatonin 3 mg p o  HS

## 2022-03-18 NOTE — ASSESSMENT & PLAN NOTE
· Continue PT OT services  · Continue all rheumatoid arthritis medications  · Continue to encourage patient with daily ambulation and mobility with assistance  · Encourage repositioning  · Follow-up with rheumatology as outpatient

## 2022-03-18 NOTE — ASSESSMENT & PLAN NOTE
· BP today 163/67  · Continue to monitor BP  · Continue losartan 75 mg p o   Daily  · Avoid hypotension  · Will continue to monitor BMP

## 2022-03-18 NOTE — PROGRESS NOTES
[address] [facility]  [address] [facility] [address] [phone] [facility]   [address]  Progress Note        NAME: Ernestine Diggs  AGE: [de-identified] y o  SEX: female  :  1940  DATE OF ENCOUNTER: 3/18/2022    Chief Complaint   Patient seen and examined for follow up on chronic conditions  History of Present Illness     Ernestine Marquez is an [de-identified] y o  female patient of [facility] rehab with acute and chronic medical conditions of rheumatoid arthritis of both knees, hyperlipidemia, hypertension, and ambulatory dysfunction      The patient is being seen and examined today for acute and chronic conditions  Upon examination, the patient is sitting in her recliner, alert, cooperative, and in no acute distress  She denies  sob, chest pain, abdominal pain, fever, chills, nausea/vomiting, diarrhea/constipation, or dysuria  The patient reports she has a good appetite and is drinking an adequate amount of fluids  The patient's only complaint today is intermittent bilateral knee pain that she reports has improved since starting physical therapy  She states she has no pain with sitting or walking, but has initial pain upon standing  She reports she had a difficult time with sleep last night      Per PT/OT the patient's ambulatory status is 130 feet with RW with contact guard  The patient's mobility status is bed mobility and transfers requires mod assist of 1  The patient's ADL status is upper body set-up mod assist, lower body Min/mod assist, and toileting is moderate independence        The following portions of the patient's history were reviewed and updated as appropriate: allergies, current medications, past family history, past medical history, past social history, past surgical history and problem list     Review of Systems     A review of systems was performed  All negative, except as per HPI      History     Past Medical History:   Diagnosis Date    Hypertension     Insomnia 3/18/2022    Molluscum contagiosum 3/10/2022    Nasal congestion 3/11/2022     No past surgical history on file  No family history on file  Social History     Socioeconomic History    Marital status:      Spouse name: Not on file    Number of children: Not on file    Years of education: Not on file    Highest education level: Not on file   Occupational History    Not on file   Tobacco Use    Smoking status: Not on file    Smokeless tobacco: Not on file   Substance and Sexual Activity    Alcohol use: Not on file    Drug use: Not on file    Sexual activity: Not on file   Other Topics Concern    Not on file   Social History Narrative    Not on file     Social Determinants of Health     Financial Resource Strain: Not on file   Food Insecurity: Not on file   Transportation Needs: Not on file   Physical Activity: Not on file   Stress: Not on file   Social Connections: Not on file   Intimate Partner Violence: Not on file   Housing Stability: Not on file     Allergies   Allergen Reactions    Aceinhibitors [Ace Inhibitors] Other (See Comments)        Atenolol Other (See Comments)              Objective     Vital Signs  BP: 163/67 HR:61    T:97 5   RR:18          O2Sat:95%      W:136 2  General: NAD, Well Nourished, Well Developed  Oral: Oropharynx Moist and Clear  Neck: Supple, +ROM  CV: S1, S2, normal rate, regular rhythm, no murmur appreciated  Pulmonary: Lung sounds clear to air, no wheezing, rhonchi, rales  Abdominal:BS + x4 in all quadrants, soft, no mass, no tenderness  Extremities: BLE +lymphedema, + BLE limited ROM, +Weakness  Skin: Warm, Dry, Left buttock clustered/scattered/ Molluscum contagiosim lesions, no rash, no erythema present, no ecchymosis present  Neurological: CN 2-12 intact, PERRLA  Psych: Alert and oriented times 3, pleasant mood, no affect, good judgement     Pertinent Laboratory/Diagnostic Studies:  CBC WITH DIFF  HEMOGLOBIN 9 6 g/dL 11 5-14 5 L Final  HEMATOCRIT 28 6 % 35 0-43 0 L Final  WBC 3 1 thou/cmm 4 0-10 0 L Final  RBC 3 14 mill/cmm 3 70-4 70 L Final  PLATELET COUNT 745 thou/cmm 140-350 Final  MPV 7 3 fL 7 5-11 3 L Final  MCV 91 fL  Final  MCH 30 5 pg 26 0-34 0 Final  MCHC 33 5 g/dL 32 0-37 0 Final  RDW 15 7 % 12 0-16 0 Final  DIFFERENTIAL TYPE AUTO Final  ABSOLUTE NEUT 0 9 thou/cmm 1 8-7 8 L Final  ABSOLUTE LYMPH 1 7 thou/cmm 1 0-3 0 Final  ABSOLUTE MONO 0 5 thou/cmm 0 3-1 0 Final  ABSOLUTE EOS 0 1 thou/cmm 0 0-0 5 Final  ABSOLUTE BASO 0 0 thou/cmm 0 0-0 1 Final  NEUTROPHILS 29 % Final  LYMPHOCYTES 53 % Final  MONOCYTES 15 % Final  EOSINOPHILS 2 % Final  BASOPHILS 1 % Final  COMP METAB PANEL  GLUCOSE 79 mg/dL 65-99 Final  BUN 18 mg/dL 7-25 Final  CREATININE 0 58 mg/dL 0 40-1 10 Final  SODIUM 143 mmol/L 135-145 Final  POTASSIUM 4 0 mmol/L 3 5-5 2 Final  CHLORIDE 108 mmol/L 100-109 Final  CARBON DIOXIDE 29 mmol/L 23-31 Final  CALCIUM 9 0 mg/dL 8 5-10 1 Final  ALKALINE PHOSPHATASE 53 U/L  Final  ALBUMIN 2 7 g/dL 3 5-4 8 L Final  BILIRUBIN,TOTAL 0 3 mg/dL 0 2-1 0 Final  Use of this assay is not recommended for patients undergoing treatment   with eltrombopag due to the potential for falsely elevated results  PROTEIN, TOTAL 5 9 g/dL 6 3-8 3 L Final  AST 51 U/L <41 H Final  ALT 30 U/L <56 Final  ANION GAP 6 3-11 Final  eGFRcr 91 >59 Final    Current Medications     Current Medications Reviewed and updated in Nursing Home EMR      Assessment and Plan     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)  · Continue PT OT services  · Continue all rheumatoid arthritis medications  · Continue to encourage patient with daily ambulation and mobility with assistance  · Encourage repositioning  · Follow-up with rheumatology as outpatient    Ambulatory dysfunction   Maintain fall and safety precautions   Encourage use of call bell   Continue PT/OT services   Assist with transfers, mobility, and ADLs      Primary hypertension  · BP today 163/67  · Continue to monitor BP  · Continue losartan 75 mg p o   Daily  · Avoid hypotension  · Will continue to monitor BMP      Insomnia  · Patient reports trouble with sleeping last night  · Encourage adequate sleep hygiene, sleep/wake cycle  · Will start melatonin 3 mg p o  HS      [address] [facility]  Geriatric Medicine  3/18/2022

## 2022-03-19 ENCOUNTER — TELEPHONE (OUTPATIENT)
Dept: OTHER | Facility: OTHER | Age: 82
End: 2022-03-19

## 2022-03-19 NOTE — TELEPHONE ENCOUNTER
[facility] @ [address] [place] [address] [id number]:  Melissa Waldron 1940-Patient has a blood pressure of 193/85  Patient is also experiencing pain on right side of head and right eye

## 2022-03-21 ENCOUNTER — NURSING HOME VISIT (OUTPATIENT)
Dept: GERIATRICS | Facility: OTHER | Age: 82
End: 2022-03-21
Payer: MEDICARE

## 2022-03-21 DIAGNOSIS — B08.1 MOLLUSCUM CONTAGIOSUM: ICD-10-CM

## 2022-03-21 DIAGNOSIS — I10 PRIMARY HYPERTENSION: Primary | ICD-10-CM

## 2022-03-21 DIAGNOSIS — M06.09 RHEUMATOID ARTHRITIS OF MULTIPLE SITES WITH NEGATIVE RHEUMATOID FACTOR (HCC): ICD-10-CM

## 2022-03-21 DIAGNOSIS — R26.2 AMBULATORY DYSFUNCTION: ICD-10-CM

## 2022-03-21 PROCEDURE — 99309 SBSQ NF CARE MODERATE MDM 30: CPT | Performed by: NURSE PRACTITIONER

## 2022-03-21 NOTE — ASSESSMENT & PLAN NOTE
· Continue PT/OT services  · Patient reports improvement in pain  · Continue on rheumatoid arthritis medications  · Continue to encourage ability and ambulation with assist  · Follow-up with rheumatology as outpatient

## 2022-03-21 NOTE — ASSESSMENT & PLAN NOTE
· BP today 184/76  · Will increase losartan 50 mg p o  Q 12 hours  · Avoid hypotension  · Continue to monitor BP closely, b i d

## 2022-03-21 NOTE — PROGRESS NOTES
[place] [address] [place] [address]  [phone] [facility]   [address]  Progress Note        NAME: Ernestine Diggs  AGE: [de-identified] y o  SEX: female  :  1940  DATE OF ENCOUNTER: 3/21/2022    Chief Complaint   Patient seen and examined for follow up on chronic conditions  History of Present Illness     Ernestine Galaviz is an [de-identified] y o  female patient of [facility] rehab with acute and chronic medical conditions of rheumatoid arthritis of both knees, hyperlipidemia, hypertension, and ambulatory dysfunction      The patient is being seen and examined today for acute and chronic conditions  Upon examination, the patient is sitting in her recliner, alert, cooperative, and in no acute distress  She denies  sob, chest pain, abdominal pain, fever, chills, nausea/vomiting, diarrhea/constipation, or dysuria  The patient reports she has a good appetite and is drinking an adequate amount of fluids  The patient's only complaint today is intermittent bilateral knee pain that she reports has improved since starting physical therapy  Nursing reports patient has several hypertensive episodes over the weekend with a noted headache  Patient denies headache today and reports I think it's my nerves    Patient reports improvement in pain in bilateral extremities      Per PT/OT the patient's ambulatory status is 130 feet with RW with contact guard  The patient's mobility status is bed mobility and transfers requires mod assist of 1  The patient's ADL status is upper body set-up mod assist, lower body Min/mod assist, and toileting is moderate independence        The following portions of the patient's history were reviewed and updated as appropriate: allergies, current medications, past family history, past medical history, past social history, past surgical history and problem list     Review of Systems     A review of systems was performed  All negative, except as per HPI      History     Past Medical History: Diagnosis Date    Hypertension     Insomnia 3/18/2022    Molluscum contagiosum 3/10/2022    Nasal congestion 3/11/2022     No past surgical history on file  No family history on file  Social History     Socioeconomic History    Marital status:      Spouse name: Not on file    Number of children: Not on file    Years of education: Not on file    Highest education level: Not on file   Occupational History    Not on file   Tobacco Use    Smoking status: Not on file    Smokeless tobacco: Not on file   Substance and Sexual Activity    Alcohol use: Not on file    Drug use: Not on file    Sexual activity: Not on file   Other Topics Concern    Not on file   Social History Narrative    Not on file     Social Determinants of Health     Financial Resource Strain: Not on file   Food Insecurity: Not on file   Transportation Needs: Not on file   Physical Activity: Not on file   Stress: Not on file   Social Connections: Not on file   Intimate Partner Violence: Not on file   Housing Stability: Not on file     Allergies   Allergen Reactions    Aceinhibitors [Ace Inhibitors] Other (See Comments)        Atenolol Other (See Comments)              Objective     Vital Signs  BP: 184/76 HR:65    T:97 5   RR:18          O2Sat:97%      W:134 6  General: NAD, Well Nourished, Well Developed  Oral: Oropharynx Moist and Clear  Neck: Supple, +ROM  CV: S1, S2, normal rate, regular rhythm, no murmur appreciated  Pulmonary: Lung sounds clear to air, no wheezing, rhonchi, rales  Abdominal:BS + x4 in all quadrants, soft, no mass, no tenderness  Extremities: BLE +lymphedema, + BLE limited ROM, +Weakness  Skin: Warm, Dry, Left buttock clustered/scattered/ Molluscum contagiosim lesions, no rash, no erythema present, no ecchymosis present  Neurological: CN 2-12 intact, PERRLA  Psych: Alert and oriented times 3, pleasant mood, no affect, good judgement     Pertinent Laboratory/Diagnostic Studies:  CBC WITH DIFF  HEMOGLOBIN 9 6 g/dL 11 5-14 5 L Final  HEMATOCRIT 28 6 % 35 0-43 0 L Final  WBC 3 1 thou/cmm 4 0-10 0 L Final  RBC 3 14 mill/cmm 3 70-4 70 L Final  PLATELET COUNT 380 thou/cmm 140-350 Final  MPV 7 3 fL 7 5-11 3 L Final  MCV 91 fL  Final  MCH 30 5 pg 26 0-34 0 Final  MCHC 33 5 g/dL 32 0-37 0 Final  RDW 15 7 % 12 0-16 0 Final  DIFFERENTIAL TYPE AUTO Final  ABSOLUTE NEUT 0 9 thou/cmm 1 8-7 8 L Final  ABSOLUTE LYMPH 1 7 thou/cmm 1 0-3 0 Final  ABSOLUTE MONO 0 5 thou/cmm 0 3-1 0 Final  ABSOLUTE EOS 0 1 thou/cmm 0 0-0 5 Final  ABSOLUTE BASO 0 0 thou/cmm 0 0-0 1 Final  NEUTROPHILS 29 % Final  LYMPHOCYTES 53 % Final  MONOCYTES 15 % Final  EOSINOPHILS 2 % Final  BASOPHILS 1 % Final  COMP METAB PANEL  GLUCOSE 79 mg/dL 65-99 Final  BUN 18 mg/dL 7-25 Final  CREATININE 0 58 mg/dL 0 40-1 10 Final  SODIUM 143 mmol/L 135-145 Final  POTASSIUM 4 0 mmol/L 3 5-5 2 Final  CHLORIDE 108 mmol/L 100-109 Final  CARBON DIOXIDE 29 mmol/L 23-31 Final  CALCIUM 9 0 mg/dL 8 5-10 1 Final  ALKALINE PHOSPHATASE 53 U/L  Final  ALBUMIN 2 7 g/dL 3 5-4 8 L Final  BILIRUBIN,TOTAL 0 3 mg/dL 0 2-1 0 Final  Use of this assay is not recommended for patients undergoing treatment   with eltrombopag due to the potential for falsely elevated results  PROTEIN, TOTAL 5 9 g/dL 6 3-8 3 L Final  AST 51 U/L <41 H Final  ALT 30 U/L <56 Final  ANION GAP 6 3-11 Final  eGFRcr 91 >59 Final  Current Medications     Current Medications Reviewed and updated in Nursing Home EMR  Assessment and Plan     Primary hypertension  · BP today 184/76  · Will increase losartan 50 mg p o  Q 12 hours  · Avoid hypotension  · Continue to monitor BP closely, b i d        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

## 2022-03-23 ENCOUNTER — NURSING HOME VISIT (OUTPATIENT)
Dept: GERIATRICS | Facility: OTHER | Age: 82
End: 2022-03-23
Payer: MEDICARE

## 2022-03-23 DIAGNOSIS — R26.2 AMBULATORY DYSFUNCTION: ICD-10-CM

## 2022-03-23 DIAGNOSIS — M06.09 RHEUMATOID ARTHRITIS OF MULTIPLE SITES WITH NEGATIVE RHEUMATOID FACTOR (HCC): ICD-10-CM

## 2022-03-23 DIAGNOSIS — I10 PRIMARY HYPERTENSION: Primary | ICD-10-CM

## 2022-03-23 PROBLEM — M35.9 NEUTROPENIA ASSOCIATED WITH AUTOIMMUNE DISEASE (HCC): Status: ACTIVE | Noted: 2022-03-23

## 2022-03-23 PROBLEM — D70.4 NEUTROPENIA ASSOCIATED WITH AUTOIMMUNE DISEASE (HCC): Status: ACTIVE | Noted: 2022-03-23

## 2022-03-23 PROCEDURE — 99309 SBSQ NF CARE MODERATE MDM 30: CPT | Performed by: NURSE PRACTITIONER

## 2022-03-23 NOTE — PROGRESS NOTES
[address] [phone] [facility]   [address]  Progress Note        NAME: Ernestine Diggs  AGE: [de-identified] y o  SEX: female  :  1940  DATE OF ENCOUNTER: 3/23/2022    Chief Complaint   Patient seen and examined for follow up on chronic conditions  History of Present Illness     Ernestine Saenz is an [de-identified] y o  female patient of [facility] rehab with acute and chronic medical conditions of rheumatoid arthritis of both knees, hyperlipidemia, hypertension, and ambulatory dysfunction      The patient is being seen and examined today for acute and chronic conditions  Upon examination, the patient is sitting in her recliner, alert, cooperative, and in no acute distress  She denies  sob, chest pain, abdominal pain, fever, chills, nausea/vomiting, diarrhea/constipation, or dysuria  The patient reports she has a good appetite and is drinking an adequate amount of fluids  The patient's only complaint today is intermittent bilateral knee pain that she reports has improved since starting physical therapy  Patient also saw rheumatology earlier this week  Per PT/OT the patient's ambulatory status is 210 feet with RW with supervision  The patient's mobility status is bed mobility and transfers requires mod assist of 1  The patient's ADL status is upper body set-up mod assist, lower body contact guard, and toileting requires standby assistance  The patient refused to take the [facility] REHABILITATION test     The following portions of the patient's history were reviewed and updated as appropriate: allergies, current medications, past family history, past medical history, past social history, past surgical history and problem list     Review of Systems     A review of systems was performed  All negative, except as per HPI      History     Past Medical History:   Diagnosis Date    Hypertension     Insomnia 3/18/2022    Molluscum contagiosum 3/10/2022    Nasal congestion 3/11/2022    [facility] 3/[facility]     No past surgical history on file  No family history on file  Social History     Socioeconomic History    Marital status:      Spouse name: Not on file    Number of children: Not on file    Years of education: Not on file    Highest education level: Not on file   Occupational History    Not on file   Tobacco Use    Smoking status: Not on file    Smokeless tobacco: Not on file   Substance and Sexual Activity    Alcohol use: Not on file    Drug use: Not on file    Sexual activity: Not on file   Other Topics Concern    Not on file   Social History Narrative    Not on file     Social Determinants of Health     Financial Resource Strain: Not on file   Food Insecurity: Not on file   Transportation Needs: Not on file   Physical Activity: Not on file   Stress: Not on file   Social Connections: Not on file   Intimate Partner Violence: Not on file   Housing Stability: Not on file     Allergies   Allergen Reactions    Aceinhibitors [Ace Inhibitors] Other (See Comments)        Atenolol Other (See Comments)              Objective     Vital Signs  BP: 117/56 HR:61    T:97 1   RR:18          O2Sat:96%      W:134 6  General: NAD, Well Nourished, Well Developed  Oral: Oropharynx Moist and Clear  Neck: Supple, +ROM  CV: S1, S2, normal rate, regular rhythm, no murmur appreciated  Pulmonary: Lung sounds clear to air, no wheezing, rhonchi, rales  Abdominal:BS + x4 in all quadrants, soft, no mass, no tenderness  Extremities: BLE +lymphedema, + BLE limited ROM, +Weakness  Skin: Warm, Dry, Left buttock clustered/scattered/ Molluscum contagiosim lesions, no rash, no erythema present, no ecchymosis present  Neurological: CN 2-12 intact, PERRLA  Psych: Alert and oriented times 3, pleasant mood, no affect, good judgement     Pertinent Laboratory/Diagnostic Studies:  CBC WITH DIFF  HEMOGLOBIN 10 6 g/dL 11 5-14 5 L Final  HEMATOCRIT 31 0 % 35 0-43 0 L Final  WBC 2 2 thou/cmm 4 0-10 0 L Final  RBC 3 37 mill/cmm 3 70-4 70 L Final  PLATELET COUNT 101 thou/cmm 140-350 Final  MPV 7 9 fL 7 5-11 3 Final  MCV 92 fL  Final  MCH 31 4 pg 26 0-34 0 Final  MCHC 34 1 g/dL 32 0-37 0 Final  RDW 14 6 % 12 0-16 0 Final  DIFFERENTIAL TYPE AUTO Final  ABSOLUTE NEUT 0 3 thou/cmm 1 8-7 8 LL Final  ABSOLUTE LYMPH 1 4 thou/cmm 1 0-3 0 Final  ABSOLUTE MONO 0 4 thou/cmm 0 3-1 0 Final  ABSOLUTE EOS 0 1 thou/cmm 0 0-0 5 Final  ABSOLUTE BASO 0 0 thou/cmm 0 0-0 1 Final  NEUTROPHILS 16 % Final  LYMPHOCYTES 62 % Final  MONOCYTES 19 % Final  EOSINOPHILS 3 % Final  BASOPHILS 0 % Final  SED RATE 60 mm/hr 0-30 H Final      Current Medications     Current Medications Reviewed and updated in Nursing Home EMR  Assessment and Plan     Primary hypertension  · BP improved today with reading of 117/56  · Continue losartan 50 mg p o  Q 12 hours  · Avoid hypertension  · Continue to monitor BP daily b i d      Neutropenia associated with autoimmune disease ([facility] )  · Critical lab value today of neutrophil count 0 3, likely due to rheumatoid arthritis and methotrexate  · Rheumatology contacted by nursing staff to report finding  · Awaiting orders/changes from Rheumatology  ·  continue to monitor    Rheumatoid arthritis of multiple sites with negative rheumatoid factor ([facility] )  · Patient has follow-up with Rheumatology earlier this week with labs ordered  · No changes in medication regimen s/p appointment  · Continue PT OT services    Ambulatory dysfunction   Maintain fall and safety precautions   Encourage use of call bell   Continue PT/OT services   Assist with transfers, mobility, and ADLs        [address] [facility]  Geriatric Medicine  3/23/2022

## 2022-03-23 NOTE — ASSESSMENT & PLAN NOTE
· BP improved today with reading of 117/56  · Continue losartan 50 mg p o  Q 12 hours  · Avoid hypertension  · Continue to monitor BP daily b i d

## 2022-03-23 NOTE — ASSESSMENT & PLAN NOTE
· Critical lab value today of neutrophil count 0 3, likely due to rheumatoid arthritis and methotrexate  · Rheumatology contacted by nursing staff to report finding  · Awaiting orders/changes from Rheumatology  ·  continue to monitor

## 2022-03-23 NOTE — ASSESSMENT & PLAN NOTE
· Patient has follow-up with Rheumatology earlier this week with labs ordered  · No changes in medication regimen s/p appointment  · Continue PT OT services

## 2022-03-24 ENCOUNTER — NURSING HOME VISIT (OUTPATIENT)
Dept: GERIATRICS | Facility: OTHER | Age: 82
End: 2022-03-24
Payer: MEDICARE

## 2022-03-24 DIAGNOSIS — B08.1 MOLLUSCUM CONTAGIOSUM: ICD-10-CM

## 2022-03-24 DIAGNOSIS — R26.2 AMBULATORY DYSFUNCTION: ICD-10-CM

## 2022-03-24 DIAGNOSIS — M06.09 RHEUMATOID ARTHRITIS OF MULTIPLE SITES WITH NEGATIVE RHEUMATOID FACTOR (HCC): ICD-10-CM

## 2022-03-24 DIAGNOSIS — I10 PRIMARY HYPERTENSION: Primary | ICD-10-CM

## 2022-03-24 PROCEDURE — 99309 SBSQ NF CARE MODERATE MDM 30: CPT | Performed by: NURSE PRACTITIONER

## 2022-03-24 NOTE — ASSESSMENT & PLAN NOTE
Urine cytology [facility] 8/28/19    Negative for malignanct cells  Acute inflammation  satisfactor for eval but with scant urothelial component  bloody    Maintain fall and safety precautions   Encourage use of call bell   Continue PT/OT services   Assist with transfers, mobility, and ADLs

## 2022-03-24 NOTE — ASSESSMENT & PLAN NOTE
· BP today 150/70  · Continue losartan 50 mg p o  Q 12  · Will start patient on hydralazine 10 mg p o  T i d   With hold parameters for systolic 601 or below  · Avoid hypotension  · Continue to monitor blood pressure closely

## 2022-03-24 NOTE — ASSESSMENT & PLAN NOTE
· As per Rheumatology no changes in medications, continue on rheumatoid arthritis meds    · Repeat of lab work tomorrow related to neutrophil count 0 3 results on 03/23/2022

## 2022-03-24 NOTE — PROGRESS NOTES
[address] [address] [phone]    [address]  Progress Note        NAME: Ernestine Diggs  AGE: [de-identified] y o  SEX: female  :  1940  DATE OF ENCOUNTER: 3/24/2022    Chief Complaint   Patient seen and examined for follow up on chronic conditions  History of Present Illness     Ernestine Weber is an [de-identified] y o  female patient of [facility] rehab with acute and chronic medical conditions of rheumatoid arthritis of both knees, hyperlipidemia, hypertension, and ambulatory dysfunction      The patient is being seen and examined today for continued hypertensive episodes  Upon examination, the patient is sitting in her wheelchair alert, cooperative, and in no acute distress  She denies  sob, chest pain, abdominal pain, fever, chills, nausea/vomiting, diarrhea/constipation, or dysuria  The patient reports she has a good appetite and is drinking an adequate amount of fluids  The patient's only complaint today is intermittent bilateral knee pain that she reports has improved since starting physical therapy  Patient reports her head felt a little funny yesterday      Per PT/OT the patient's ambulatory status is 210 feet with RW with supervision  The patient's mobility status is bed mobility and transfers requires mod assist of 1  The patient's ADL status is upper body set-up mod assist, lower body contact guard, and toileting requires standby assistance  The patient refused to take the [facility] REHABILITATION test   The following portions of the patient's history were reviewed and updated as appropriate: allergies, current medications, past family history, past medical history, past social history, past surgical history and problem list     Review of Systems     A review of systems was performed  All negative, except as per HPI      History     Past Medical History:   Diagnosis Date    Hypertension     Insomnia 3/18/2022    Molluscum contagiosum 3/10/2022    Nasal congestion 3/11/2022    [facility] with [facility] 3/23/2022     No past surgical history on file  No family history on file  Social History     Socioeconomic History    Marital status:      Spouse name: Not on file    Number of children: Not on file    Years of education: Not on file    Highest education level: Not on file   Occupational History    Not on file   Tobacco Use    Smoking status: Not on file    Smokeless tobacco: Not on file   Substance and Sexual Activity    Alcohol use: Not on file    Drug use: Not on file    Sexual activity: Not on file   Other Topics Concern    Not on file   Social History Narrative    Not on file     Social Determinants of Health     Financial Resource Strain: Not on file   Food Insecurity: Not on file   Transportation Needs: Not on file   Physical Activity: Not on file   Stress: Not on file   Social Connections: Not on file   Intimate Partner Violence: Not on file   Housing Stability: Not on file     Allergies   Allergen Reactions    Aceinhibitors [Ace Inhibitors] Other (See Comments)        Atenolol Other (See Comments)              Objective     Vital Signs  BP: 117/56 HR:61    T:97 1   RR:18          O2Sat:96%      W:134 6  General: NAD, Well Nourished, Well Developed  Oral: Oropharynx Moist and Clear  Neck: Supple, +ROM  CV: S1, S2, normal rate, regular rhythm, no murmur appreciated  Pulmonary: Lung sounds clear to air, no wheezing, rhonchi, rales  Abdominal:BS + x4 in all quadrants, soft, no mass, no tenderness  Extremities: BLE +lymphedema, + BLE limited ROM, +Weakness  Skin: Warm, Dry, Left buttock clustered/scattered/ Molluscum contagiosim lesions, no rash, no erythema present, no ecchymosis present  Neurological: CN 2-12 intact, PERRLA  Psych: Alert and oriented times 3, pleasant mood, no affect, good judgement      Pertinent Laboratory/Diagnostic Studies:  CBC WITH DIFF  HEMOGLOBIN 10 6 g/dL 11 5-14 5 L Final  HEMATOCRIT 31 0 % 35 0-43 0 L Final  WBC 2 2 thou/cmm 4 0-10 0 L Final  RBC 3 37 mill/cmm 3 70-4 70 L Final  PLATELET COUNT 599 thou/cmm 140-350 Final  MPV 7 9 fL 7 5-11 3 Final  MCV 92 fL  Final  MCH 31 4 pg 26 0-34 0 Final  MCHC 34 1 g/dL 32 0-37 0 Final  RDW 14 6 % 12 0-16 0 Final  DIFFERENTIAL TYPE AUTO Final  ABSOLUTE NEUT 0 3 thou/cmm 1 8-7 8 LL Final  ABSOLUTE LYMPH 1 4 thou/cmm 1 0-3 0 Final  ABSOLUTE MONO 0 4 thou/cmm 0 3-1 0 Final  ABSOLUTE EOS 0 1 thou/cmm 0 0-0 5 Final  ABSOLUTE BASO 0 0 thou/cmm 0 0-0 1 Final  NEUTROPHILS 16 % Final  LYMPHOCYTES 62 % Final  MONOCYTES 19 % Final  EOSINOPHILS 3 % Final  BASOPHILS 0 % Final  SED RATE 60 mm/hr 0-30 H Final    Current Medications     Current Medications Reviewed and updated in Nursing Home EMR  Assessment and Plan     Primary hypertension  · BP today 150/70  · Continue losartan 50 mg p o  Q 12  · Will start patient on hydralazine 10 mg p o  T i d  With hold parameters for systolic 085 or below  · Avoid hypotension  · Continue to monitor blood pressure closely      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)  · As per Rheumatology no changes in medications, continue on rheumatoid arthritis meds    · Repeat of lab work tomorrow related to neutrophil count 0 3 results on 03/23/2022    Ambulatory dysfunction   Maintain fall and safety precautions   Encourage use of call bell   Continue PT/OT services   Assist with transfers, mobility, and ADLs      Molluscum contagiosum  · Continue Aldera cream BID  · Continue to monitor lesions      Nicole Bryant[address]  3/24/2022

## 2022-03-25 ENCOUNTER — NURSING HOME VISIT (OUTPATIENT)
Dept: GERIATRICS | Facility: OTHER | Age: 82
End: 2022-03-25
Payer: MEDICARE

## 2022-03-25 DIAGNOSIS — I10 PRIMARY HYPERTENSION: ICD-10-CM

## 2022-03-25 DIAGNOSIS — R26.2 AMBULATORY DYSFUNCTION: ICD-10-CM

## 2022-03-25 DIAGNOSIS — B08.1 MOLLUSCUM CONTAGIOSUM: ICD-10-CM

## 2022-03-25 DIAGNOSIS — M06.09 RHEUMATOID ARTHRITIS OF MULTIPLE SITES WITH NEGATIVE RHEUMATOID FACTOR (HCC): Primary | ICD-10-CM

## 2022-03-25 PROCEDURE — 99316 NF DSCHRG MGMT 30 MIN+: CPT | Performed by: NURSE PRACTITIONER

## 2022-03-25 NOTE — ASSESSMENT & PLAN NOTE
·  Neutrophil count improved to 0 5, with results faxed to rheumatology  ·  continue all rheumatoid arthritis medications  ·  follow-up with Rheumatology

## 2022-03-25 NOTE — ASSESSMENT & PLAN NOTE
 Maintain fall and safety precautions   Encourage use assistive devices   Continue PT/OT services  With home health agency   Assist with transfers, mobility, and ADLs

## 2022-03-25 NOTE — PROGRESS NOTES
[address]  [address]  [phone]) [place]  Discharge Summary        NAME: Merly Diggs  AGE: [de-identified] y o  SEX: female  :  1940  DATE OF ENCOUNTER: 3/25/2022    Chief Complaint   Patient seen and examined for follow up on chronic conditions  History of Present Illness     Ernestine Saenz is an [de-identified] y o  female patient of [facility] rehab with acute and chronic medical conditions of rheumatoid arthritis of both knees, hyperlipidemia, hypertension, and ambulatory dysfunction      The patient is being seen and examined today for discharge  Upon examination, the patient is sitting in her wheelchair alert, cooperative, and in no acute distress  She denies  sob, chest pain, abdominal pain, fever, chills, nausea/vomiting, diarrhea/constipation, or dysuria  The patient reports she has a good appetite and is drinking an adequate amount of fluids  The patient's only complaint today is intermittent bilateral knee pain that she reports has improved since starting physical therapy  Patient has no other questions or concerns today      Per PT/OT the patient's ambulatory status is 210 feet with RW with supervision  The patient's mobility status is bed mobility and transfers requires mod assist of 1  The patient's ADL status is upper body set-up mod assist, lower body contact guard, and toileting requires standby assistance  The patient refused to take the [facility] test     Upon discharge home, patient [facility]  [facility]  The following portions of the patient's history were reviewed and updated as appropriate: allergies, current medications, past family history, past medical history, past social history, past surgical history and problem list     Review of Systems     A review of systems was performed  All negative, except as per HPI      History     Past Medical History:   Diagnosis Date    Hypertension     Insomnia 3/18/2022    Molluscum contagiosum 3/10/2022    Nasal congestion 3/11/2022    Neutropenia associated with autoimmune disease ([facility] ) 3/23/2022     No past surgical history on file  No family history on file  Social History     Socioeconomic History    Marital status:      Spouse name: Not on file    Number of children: Not on file    Years of education: Not on file    Highest education level: Not on file   Occupational History    Not on file   Tobacco Use    Smoking status: Not on file    Smokeless tobacco: Not on file   Substance and Sexual Activity    Alcohol use: Not on file    Drug use: Not on file    Sexual activity: Not on file   Other Topics Concern    Not on file   Social History Narrative    Not on file     Social Determinants of Health     Financial Resource Strain: Not on file   Food Insecurity: Not on file   Transportation Needs: Not on file   Physical Activity: Not on file   Stress: Not on file   Social Connections: Not on file   Intimate Partner Violence: Not on file   Housing Stability: Not on file     Allergies   Allergen Reactions    Aceinhibitors [Ace Inhibitors] Other (See Comments)        Atenolol Other (See Comments)              Objective     Vital Signs   BP: 169/72 HR:61    T:97 5   RR:18          O2Sat:99%      W:134 6  General: NAD, Well Nourished, Well Developed  Oral: Oropharynx Moist and Clear  Neck: Supple, +ROM  CV: S1, S2, normal rate, regular rhythm, no murmur appreciated  Pulmonary: Lung sounds clear to air, no wheezing, rhonchi, rales  Abdominal:BS + x4 in all quadrants, soft, no mass, no tenderness  Extremities: BLE +lymphedema, + BLE limited ROM, +Weakness  Skin: Warm, Dry, Left buttock clustered/scattered/ Molluscum contagiosim lesions, no rash, no erythema present, no ecchymosis present  Neurological: CN 2-12 intact, PERRLA  Psych: Alert and oriented times 3, pleasant mood, no affect, good judgement     Pertinent Laboratory/Diagnostic Studies:   CBC WITH DIFF  HEMOGLOBIN 10 4 g/dL 11 5-14 5 L Final  HEMATOCRIT 31 0 % 35 0-43 0 L Final  WBC 2 5 thou/cmm 4 0-10 0 L Final  RBC 3 37 mill/cmm 3 70-4 70 L Final  PLATELET COUNT 541 thou/cmm 140-350 Final  MPV 7 8 fL 7 5-11 3 Final  MCV 92 fL  Final  MCH 31 0 pg 26 0-34 0 Final  MCHC 33 6 g/dL 32 0-37 0 Final  RDW 15 0 % 12 0-16 0 Final  DIFFERENTIAL TYPE AUTO Final  ABSOLUTE NEUT 0 5 thou/cmm 1 8-7 8 LL Final  ABSOLUTE LYMPH 1 5 thou/cmm 1 0-3 0 Final  ABSOLUTE MONO 0 4 thou/cmm 0 3-1 0 Final  ABSOLUTE EOS 0 1 thou/cmm 0 0-0 5 Final  ABSOLUTE BASO 0 0 thou/cmm 0 0-0 1 Final  NEUTROPHILS 20 % Final  LYMPHOCYTES 60 % Final  MONOCYTES 16 % Final  EOSINOPHILS 3 % Final  BASOPHILS 1 % Final  PROTEIN ELECTRO  ,SER     PROTEIN ELECTRO  ,SER     [place] Final  PROTEIN, TOTAL 6 5 g/dL 6 3-8 3 Final  ALBUMIN, SER Pending g/dL 4 1-5 1 Pending  ALPHA-1 REGION,SER Pending g/dL 0 1-0 2 Pending  ALPHA-2 REGION,SER Pending g/dL 0 6-0 9 Pending  BETA REGION, SER Pending g/dL 0 6-1 0 Pending  GAMMA REGION, SER Pending g/dL 0 5-1 2 Pending  A/G RATIO Pending Pending  PROTEIN ELEC  INTP Pending Pending      Current Medications     Current Medications Reviewed and updated in Nursing Home EMR  Assessment and Plan     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)  ·  Neutrophil count improved to 0 5, with results faxed to rheumatology  ·  continue all rheumatoid arthritis medications  ·  follow-up with Rheumatology    Primary hypertension  ·  Continue losartan  50 mg p o  q 12  ·  continue hydralazine 10 mg p o  t i d    As directed  ·  follow-up with cardiology outpatient      Ambulatory dysfunction   Maintain fall and safety precautions   Encourage use assistive devices   Continue PT/OT services  With home health agency   Assist with transfers, mobility, and ADLs      Molluscum contagiosum  ·  Continue Aldara cream b i d   ·  follow-up with PCP      Discussion with patient/family and further instructions:  -Fall precautions  -Aspiration precautions  -Bleeding precautions  -Monitor for signs/symptoms of infection  -Medication list was reviewed and signed  -DME form was completed     Follow-up Recommendations: Please follow-up with your primary care physician within 7-10 days of discharge to review medication changes and current status  Problem List Follow-up Recommendations:  I have spent 40 minutes with Patient /Family today in which greater than 50% of this time was spent in counseling/coordination of care      [address] [facility]  Geriatric Medicine  3/25/2022

## 2022-03-25 NOTE — ASSESSMENT & PLAN NOTE
·  Continue losartan  50 mg p o  q 12  ·  continue hydralazine 10 mg p o  t i d    As directed  ·  follow-up with cardiology outpatient

## 2022-10-11 PROBLEM — B08.1 MOLLUSCUM CONTAGIOSUM: Status: RESOLVED | Noted: 2022-03-10 | Resolved: 2022-10-11

## 2024-04-26 ENCOUNTER — APPOINTMENT (EMERGENCY)
Dept: CT IMAGING | Facility: HOSPITAL | Age: 84
DRG: 872 | End: 2024-04-26
Payer: MEDICARE

## 2024-04-26 ENCOUNTER — HOSPITAL ENCOUNTER (INPATIENT)
Facility: HOSPITAL | Age: 84
LOS: 5 days | Discharge: HOME HEALTH | DRG: 872 | End: 2024-05-01
Attending: STUDENT IN AN ORGANIZED HEALTH CARE EDUCATION/TRAINING PROGRAM | Admitting: FAMILY MEDICINE
Payer: MEDICARE

## 2024-04-26 DIAGNOSIS — L03.211 FACIAL CELLULITIS: ICD-10-CM

## 2024-04-26 DIAGNOSIS — B00.9 HERPETIC LESIONS: ICD-10-CM

## 2024-04-26 DIAGNOSIS — A41.9 SEPSIS (HCC): ICD-10-CM

## 2024-04-26 DIAGNOSIS — M06.09 RHEUMATOID ARTHRITIS OF MULTIPLE SITES WITH NEGATIVE RHEUMATOID FACTOR (HCC): ICD-10-CM

## 2024-04-26 DIAGNOSIS — R21 FACIAL RASH: Primary | ICD-10-CM

## 2024-04-26 LAB
ALBUMIN SERPL BCP-MCNC: 4.3 G/DL (ref 3.5–5)
ALP SERPL-CCNC: 66 U/L (ref 34–104)
ALT SERPL W P-5'-P-CCNC: 10 U/L (ref 7–52)
ANION GAP SERPL CALCULATED.3IONS-SCNC: 13 MMOL/L (ref 4–13)
ANISOCYTOSIS BLD QL SMEAR: PRESENT
APTT PPP: 84 SECONDS (ref 23–37)
AST SERPL W P-5'-P-CCNC: 34 U/L (ref 13–39)
BASOPHILS # BLD MANUAL: 0 THOUSAND/UL (ref 0–0.1)
BASOPHILS NFR MAR MANUAL: 0 % (ref 0–1)
BILIRUB SERPL-MCNC: 0.49 MG/DL (ref 0.2–1)
BUN SERPL-MCNC: 13 MG/DL (ref 5–25)
CALCIUM SERPL-MCNC: 9.2 MG/DL (ref 8.4–10.2)
CHLORIDE SERPL-SCNC: 96 MMOL/L (ref 96–108)
CO2 SERPL-SCNC: 24 MMOL/L (ref 21–32)
CREAT SERPL-MCNC: 0.61 MG/DL (ref 0.6–1.3)
EOSINOPHIL # BLD MANUAL: 0 THOUSAND/UL (ref 0–0.4)
EOSINOPHIL NFR BLD MANUAL: 0 % (ref 0–6)
ERYTHROCYTE [DISTWIDTH] IN BLOOD BY AUTOMATED COUNT: 15.1 % (ref 11.6–15.1)
GFR SERPL CREATININE-BSD FRML MDRD: 84 ML/MIN/1.73SQ M
GLUCOSE SERPL-MCNC: 110 MG/DL (ref 65–140)
HCT VFR BLD AUTO: 35.7 % (ref 34.8–46.1)
HGB BLD-MCNC: 12 G/DL (ref 11.5–15.4)
INR PPP: 4.26 (ref 0.84–1.19)
LACTATE SERPL-SCNC: 1.2 MMOL/L (ref 0.5–2)
LYMPHOCYTES # BLD AUTO: 0.95 THOUSAND/UL (ref 0.6–4.47)
LYMPHOCYTES # BLD AUTO: 29 % (ref 14–44)
MCH RBC QN AUTO: 29.7 PG (ref 26.8–34.3)
MCHC RBC AUTO-ENTMCNC: 33.6 G/DL (ref 31.4–37.4)
MCV RBC AUTO: 88 FL (ref 82–98)
MONOCYTES # BLD AUTO: 0.25 THOUSAND/UL (ref 0–1.22)
MONOCYTES NFR BLD: 8 % (ref 4–12)
NEUTROPHILS # BLD MANUAL: 1.88 THOUSAND/UL (ref 1.85–7.62)
NEUTS BAND NFR BLD MANUAL: 1 % (ref 0–8)
NEUTS SEG NFR BLD AUTO: 60 % (ref 43–75)
PLATELET # BLD AUTO: 247 THOUSANDS/UL (ref 149–390)
PLATELET BLD QL SMEAR: ADEQUATE
PMV BLD AUTO: 8.1 FL (ref 8.9–12.7)
POTASSIUM SERPL-SCNC: 3.9 MMOL/L (ref 3.5–5.3)
PROCALCITONIN SERPL-MCNC: 0.36 NG/ML
PROT SERPL-MCNC: 8.5 G/DL (ref 6.4–8.4)
PROTHROMBIN TIME: 42.1 SECONDS (ref 11.6–14.5)
RBC # BLD AUTO: 4.04 MILLION/UL (ref 3.81–5.12)
RBC MORPH BLD: PRESENT
ROULEAUX BLD QL SMEAR: PRESENT
SODIUM SERPL-SCNC: 133 MMOL/L (ref 135–147)
VARIANT LYMPHS # BLD AUTO: 2 %
WBC # BLD AUTO: 3.08 THOUSAND/UL (ref 4.31–10.16)

## 2024-04-26 PROCEDURE — 83605 ASSAY OF LACTIC ACID: CPT | Performed by: PHYSICIAN ASSISTANT

## 2024-04-26 PROCEDURE — 99223 1ST HOSP IP/OBS HIGH 75: CPT | Performed by: FAMILY MEDICINE

## 2024-04-26 PROCEDURE — 85730 THROMBOPLASTIN TIME PARTIAL: CPT | Performed by: PHYSICIAN ASSISTANT

## 2024-04-26 PROCEDURE — 87529 HSV DNA AMP PROBE: CPT | Performed by: PHYSICIAN ASSISTANT

## 2024-04-26 PROCEDURE — 84145 PROCALCITONIN (PCT): CPT | Performed by: PHYSICIAN ASSISTANT

## 2024-04-26 PROCEDURE — 99285 EMERGENCY DEPT VISIT HI MDM: CPT

## 2024-04-26 PROCEDURE — 99285 EMERGENCY DEPT VISIT HI MDM: CPT | Performed by: PHYSICIAN ASSISTANT

## 2024-04-26 PROCEDURE — 85007 BL SMEAR W/DIFF WBC COUNT: CPT | Performed by: PHYSICIAN ASSISTANT

## 2024-04-26 PROCEDURE — 87205 SMEAR GRAM STAIN: CPT | Performed by: PHYSICIAN ASSISTANT

## 2024-04-26 PROCEDURE — 70487 CT MAXILLOFACIAL W/DYE: CPT

## 2024-04-26 PROCEDURE — 99285 EMERGENCY DEPT VISIT HI MDM: CPT | Performed by: INTERNAL MEDICINE

## 2024-04-26 PROCEDURE — 85027 COMPLETE CBC AUTOMATED: CPT | Performed by: PHYSICIAN ASSISTANT

## 2024-04-26 PROCEDURE — 87798 DETECT AGENT NOS DNA AMP: CPT | Performed by: PHYSICIAN ASSISTANT

## 2024-04-26 PROCEDURE — 87070 CULTURE OTHR SPECIMN AEROBIC: CPT | Performed by: PHYSICIAN ASSISTANT

## 2024-04-26 PROCEDURE — 1124F ACP DISCUSS-NO DSCNMKR DOCD: CPT | Performed by: INTERNAL MEDICINE

## 2024-04-26 PROCEDURE — 80053 COMPREHEN METABOLIC PANEL: CPT | Performed by: PHYSICIAN ASSISTANT

## 2024-04-26 PROCEDURE — 87040 BLOOD CULTURE FOR BACTERIA: CPT | Performed by: PHYSICIAN ASSISTANT

## 2024-04-26 PROCEDURE — 96365 THER/PROPH/DIAG IV INF INIT: CPT

## 2024-04-26 PROCEDURE — 99221 1ST HOSP IP/OBS SF/LOW 40: CPT | Performed by: DERMATOLOGY

## 2024-04-26 PROCEDURE — 36415 COLL VENOUS BLD VENIPUNCTURE: CPT | Performed by: PHYSICIAN ASSISTANT

## 2024-04-26 PROCEDURE — 87252 VIRUS INOCULATION TISSUE: CPT | Performed by: PHYSICIAN ASSISTANT

## 2024-04-26 PROCEDURE — 85610 PROTHROMBIN TIME: CPT | Performed by: PHYSICIAN ASSISTANT

## 2024-04-26 RX ORDER — LEVOTHYROXINE SODIUM 0.03 MG/1
25 TABLET ORAL
Status: DISCONTINUED | OUTPATIENT
Start: 2024-04-27 | End: 2024-05-01 | Stop reason: HOSPADM

## 2024-04-26 RX ORDER — LOSARTAN POTASSIUM 50 MG/1
50 TABLET ORAL DAILY
Status: DISCONTINUED | OUTPATIENT
Start: 2024-04-26 | End: 2024-05-01 | Stop reason: HOSPADM

## 2024-04-26 RX ORDER — SODIUM CHLORIDE, SODIUM GLUCONATE, SODIUM ACETATE, POTASSIUM CHLORIDE, MAGNESIUM CHLORIDE, SODIUM PHOSPHATE, DIBASIC, AND POTASSIUM PHOSPHATE .53; .5; .37; .037; .03; .012; .00082 G/100ML; G/100ML; G/100ML; G/100ML; G/100ML; G/100ML; G/100ML
75 INJECTION, SOLUTION INTRAVENOUS CONTINUOUS
Status: DISCONTINUED | OUTPATIENT
Start: 2024-04-26 | End: 2024-05-01

## 2024-04-26 RX ORDER — GABAPENTIN 300 MG/1
300 CAPSULE ORAL 2 TIMES DAILY
Status: DISCONTINUED | OUTPATIENT
Start: 2024-04-26 | End: 2024-05-01 | Stop reason: HOSPADM

## 2024-04-26 RX ORDER — ACETAMINOPHEN 325 MG/1
650 TABLET ORAL EVERY 6 HOURS PRN
Status: DISCONTINUED | OUTPATIENT
Start: 2024-04-26 | End: 2024-05-01 | Stop reason: HOSPADM

## 2024-04-26 RX ORDER — TETRACAINE HYDROCHLORIDE 5 MG/ML
2 SOLUTION OPHTHALMIC ONCE
Status: COMPLETED | OUTPATIENT
Start: 2024-04-26 | End: 2024-04-26

## 2024-04-26 RX ORDER — PRAVASTATIN SODIUM 40 MG
40 TABLET ORAL DAILY
Status: DISCONTINUED | OUTPATIENT
Start: 2024-04-26 | End: 2024-05-01 | Stop reason: HOSPADM

## 2024-04-26 RX ORDER — PANTOPRAZOLE SODIUM 40 MG/1
40 TABLET, DELAYED RELEASE ORAL DAILY
Status: DISCONTINUED | OUTPATIENT
Start: 2024-04-26 | End: 2024-05-01 | Stop reason: HOSPADM

## 2024-04-26 RX ORDER — PREDNISONE 5 MG/1
5 TABLET ORAL DAILY
Status: DISCONTINUED | OUTPATIENT
Start: 2024-04-26 | End: 2024-05-01 | Stop reason: HOSPADM

## 2024-04-26 RX ADMIN — ACYCLOVIR SODIUM 450 MG: 50 INJECTION, SOLUTION INTRAVENOUS at 14:58

## 2024-04-26 RX ADMIN — PREDNISONE 5 MG: 5 TABLET ORAL at 15:24

## 2024-04-26 RX ADMIN — FLUORESCEIN SODIUM 1 STRIP: 1 STRIP OPHTHALMIC at 11:17

## 2024-04-26 RX ADMIN — ACETAMINOPHEN 650 MG: 325 TABLET, FILM COATED ORAL at 15:25

## 2024-04-26 RX ADMIN — VANCOMYCIN HYDROCHLORIDE 1500 MG: 1 INJECTION, POWDER, LYOPHILIZED, FOR SOLUTION INTRAVENOUS at 15:07

## 2024-04-26 RX ADMIN — ACYCLOVIR SODIUM 450 MG: 50 INJECTION, SOLUTION INTRAVENOUS at 21:28

## 2024-04-26 RX ADMIN — GABAPENTIN 300 MG: 300 CAPSULE ORAL at 21:28

## 2024-04-26 RX ADMIN — IOHEXOL 85 ML: 350 INJECTION, SOLUTION INTRAVENOUS at 12:00

## 2024-04-26 RX ADMIN — CEFTRIAXONE SODIUM 2000 MG: 10 INJECTION, POWDER, FOR SOLUTION INTRAVENOUS at 13:03

## 2024-04-26 RX ADMIN — LOSARTAN POTASSIUM 50 MG: 50 TABLET, FILM COATED ORAL at 15:24

## 2024-04-26 RX ADMIN — TETRACAINE HYDROCHLORIDE 2 DROP: 5 SOLUTION OPHTHALMIC at 11:17

## 2024-04-26 RX ADMIN — SODIUM CHLORIDE, SODIUM GLUCONATE, SODIUM ACETATE, POTASSIUM CHLORIDE, MAGNESIUM CHLORIDE, SODIUM PHOSPHATE, DIBASIC, AND POTASSIUM PHOSPHATE 75 ML/HR: .53; .5; .37; .037; .03; .012; .00082 INJECTION, SOLUTION INTRAVENOUS at 17:51

## 2024-04-26 RX ADMIN — PANTOPRAZOLE SODIUM 40 MG: 40 TABLET, DELAYED RELEASE ORAL at 15:24

## 2024-04-26 RX ADMIN — PRAVASTATIN SODIUM 40 MG: 40 TABLET ORAL at 15:26

## 2024-04-26 NOTE — PLAN OF CARE
Problem: Prexisting or High Potential for Compromised Skin Integrity  Goal: Skin integrity is maintained or improved  Description: INTERVENTIONS:  - Identify patients at risk for skin breakdown  - Assess and monitor skin integrity  - Assess and monitor nutrition and hydration status  - Monitor labs   - Assess for incontinence   - Turn and reposition patient  - Assist with mobility/ambulation  - Relieve pressure over bony prominences  - Avoid friction and shearing  - Provide appropriate hygiene as needed including keeping skin clean and dry  - Evaluate need for skin moisturizer/barrier cream  - Collaborate with interdisciplinary team   - Patient/family teaching  - Consider wound care consult   Outcome: Progressing     Problem: PAIN - ADULT  Goal: Verbalizes/displays adequate comfort level or baseline comfort level  Description: Interventions:  - Encourage patient to monitor pain and request assistance  - Assess pain using appropriate pain scale  - Administer analgesics based on type and severity of pain and evaluate response  - Implement non-pharmacological measures as appropriate and evaluate response  - Consider cultural and social influences on pain and pain management  - Notify physician/advanced practitioner if interventions unsuccessful or patient reports new pain  Outcome: Progressing     Problem: INFECTION - ADULT  Goal: Absence or prevention of progression during hospitalization  Description: INTERVENTIONS:  - Assess and monitor for signs and symptoms of infection  - Monitor lab/diagnostic results  - Monitor all insertion sites, i.e. indwelling lines, tubes, and drains  - Monitor endotracheal if appropriate and nasal secretions for changes in amount and color  - [place] appropriate cooling/warming therapies per order  - Administer medications as ordered  - Instruct and encourage patient and family to use good hand hygiene technique  - Identify and instruct in appropriate isolation precautions for  identified infection/condition  Outcome: Progressing  Goal: Absence of fever/infection during neutropenic period  Description: INTERVENTIONS:  - Monitor WBC    Outcome: Progressing     Problem: SAFETY ADULT  Goal: Patient will remain free of falls  Description: INTERVENTIONS:  - Educate patient/family on patient safety including physical limitations  - Instruct patient to call for assistance with activity   - Consult OT/PT to assist with strengthening/mobility   - Keep Call bell within reach  - Keep bed low and locked with side rails adjusted as appropriate  - Keep care items and personal belongings within reach  - Initiate and maintain comfort rounds  - Make Fall Risk Sign visible to staff  - Offer Toileting every 2 Hours, in advance of need  - Initiate/Maintain bed alarm  - Obtain necessary fall risk management equipment: call bell within reach  - Apply yellow socks and bracelet for high fall risk patients  - Consider moving patient to room near nurses station  Outcome: Progressing  Goal: Maintain or return to baseline ADL function  Description: INTERVENTIONS:  -  Assess patient's ability to carry out ADLs; assess patient's baseline for ADL function and identify physical deficits which impact ability to perform ADLs (bathing, care of mouth/teeth, toileting, grooming, dressing, etc.)  - Assess/evaluate cause of self-care deficits   - Assess range of motion  - Assess patient's mobility; develop plan if impaired  - Assess patient's need for assistive devices and provide as appropriate  - Encourage maximum independence but intervene and supervise when necessary  - Involve family in performance of ADLs  - Assess for home care needs following discharge   - Consider OT consult to assist with ADL evaluation and planning for discharge  - Provide patient education as appropriate  Outcome: Progressing    Problem: DISCHARGE PLANNING  Goal: Discharge to home or other facility with appropriate resources  Description:  INTERVENTIONS:  - Identify barriers to discharge w/patient and caregiver  - Arrange for needed discharge resources and transportation as appropriate  - Identify discharge learning needs (meds, wound care, etc.)  - Arrange for interpretive services to assist at discharge as needed  - Refer to Case Management Department for coordinating discharge planning if the patient needs post-hospital services based on physician/advanced practitioner order or complex needs related to functional status, cognitive ability, or social support system  Outcome: Progressing     Problem: Knowledge Deficit  Goal: Patient/family/caregiver demonstrates understanding of disease process, treatment plan, medications, and discharge instructions  Description: Complete learning assessment and assess knowledge base.  Interventions:  - Provide teaching at level of understanding  - Provide teaching via preferred learning methods  Outcome: Progressing

## 2024-04-26 NOTE — CONSULTS
Consultation - Infectious Disease   Ernestine Diggs 83 y.o. female MRN: [id number]  Unit/Bed#: ED 26 Encounter: [id number]      IMPRESSION & RECOMMENDATIONS:   Impression/Recommendations:  This is a 83 y.o. female, with methotrexate dependent RA, presented to ER with left facial zoster, developed 24 hours ago.  He was started on IV acyclovir and vancomycin/ceftriaxone.    1.  Left facial zoster, in the distribution of the left facial nerve, with Bell's palsy.  Patient has no headache or visual/auditory difficulties.  Low-grade fever is secondary to zoster.  There is no evidence of skin or cellulitis clinically.  Antibiotics were started in the ER.  Will discontinue this to avoid potential antibiotic toxicities.  He is clinically and systemically well, without evidence of sepsis or systemic toxicity.  Blood cultures were drawn on admission.  Left facial CT is without subcutaneous abscess.  Continue IV acyclovir for now.  Monitor rash for crusting.  When rash begins to crust, can transition to p.o. Valtrex.  No further need for antibiotic.  Will discontinue.  Monitor creatinine closely on IV acyclovir.  Recommend zoster vaccination in 3 to 4 months.    2.  Leukopenia, may be all secondary to acute viral infection but also consider medication effect due to methotrexate.  Monitor WBC.    3.   Left-sided Bell's palsy, secondary to zoster.  Monitor.    4.  RA, on weekly methotrexate and intermittent prednisone for flare.  Patient currently had prednisone started.  This is risk factor for zoster above.    Patient records reviewed in detail.  Discussed with patient and her daughter in detail regarding the above plan.  Discussed with Dr. Scott from [facility] service regarding continuation of IV acyclovir for zoster but discontinued the biotic due to absence of cellulitis.  She is in agreement.    Thank you for this consultation.  We will follow along with you.    HISTORY OF PRESENT ILLNESS:  Reason for Consult: Left facial  zoster.    HPI: Ernestine Diggs is a 83 y.o. female, with history of RA, on methotrexate and intermittent prednisone for flare, currently on prednisone, developed vesicular rash on left upper lip approximately 24 hours ago.  Over the ensuing 24 hours, patient noticed similar vesicular rash on left side of face and also inside her mouth.  In addition, she noted left facial droop.  For these reasons, patient presented to ER for evaluation.  Patient was started on IV acyclovir for zoster and IV vancomycin/ceftriaxone for cellulitis.  We are asked to evaluate the patient.    At present, patient is comfortable except for left facial swelling and left facial droop.  She has no pain in the face or mouth.  Temperature was up recently but no chills.  No headache.  No visual or hearing difficulties.    Patient has longstanding history of RA.  She is on maintenance methotrexate and she takes prednisone intermittently for flares.  Patient states that she has never received zoster vaccine.  It was ordered once but patient could not get it due to an acute illness of some sort.    REVIEW OF SYSTEMS:  A complete system-based review was done.  Except for what is noted in HPI above, ROS of systems is otherwise negative.    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  Problem list reviewed.    FAMILY HISTORY:  Non-contributory    SOCIAL HISTORY:  Social History     Substance and Sexual Activity   Alcohol Use Not on file     Social History     Substance and Sexual Activity   Drug Use Not on file     Social History     Tobacco Use   Smoking Status Not on file   Smokeless Tobacco Not on file       ALLERGIES:  Allergies   Allergen Reactions    Aceinhibitors [Ace Inhibitors] Other (See Comments)     .    Atenolol Other  (See Comments)     .       MEDICATIONS:  All current active medications have been reviewed.  Patient is currently on IV acyclovir and vancomycin.    PHYSICAL EXAM:  Vitals:  Temp:  [98.5 °F (36.9 °C)-100 °F (37.8 °C)] 98.5 °F (36.9 °C)  HR:  [71-79] 75  Resp:  [16-18] 18  BP: (175-204)/(78-90) 186/86  SpO2:  [95 %-100 %] 98 %  Temp (24hrs), Av.3 °F (37.4 °C), Min:98.5 °F (36.9 °C), Max:100 °F (37.8 °C)  Current: Temperature: 98.5 °F (36.9 °C)     Physical Exam:  General: Chronically ill-appearing, nontoxic, in no acute distress. Awake, alert and oriented x 3.  Face: Multiple vesicles on left face, along left facial nerve distribution.  Left-sided Bell's palsy present.  No purulence.  Mild erythema.  No fluctuance.  Minimal tenderness.  Eyes:  Conjunctive clear with no hemorrhages or effusions  Oropharynx: Scattered vesicle on left buccal mucosa.  No purulence.  No fluctuance.  Nontender.  Neck:  Supple, no lymphadenopathy, no mass, nontender  Lungs:  Expansion symmetric, no rales, no wheezing, no accessory muscle use  Cardiac:  Regular rate and rhythm, normal S1, normal S2, no murmurs  Abdomen:  Soft, nondistended, non-tender, no HSM  Extremities: 1+ leg edema, no erythema, nontender. No draining ulcers  Skin:  No rashes, no ulcers  Neurological:  Moves all four extremities spontaneously, sensation grossly intact    LABS, IMAGING, & OTHER STUDIES:  Lab Results:  I have personally reviewed pertinent labs.  Results from last 7 days   Lab Units 24  1117   POTASSIUM mmol/L 3.9   CHLORIDE mmol/L 96   CO2 mmol/L 24   BUN mg/dL 13   CREATININE mg/dL 0.61   EGFR ml/min/1.73sq m 84   CALCIUM mg/dL 9.2   AST U/L 34   ALT U/L 10   ALK PHOS U/L 66     Results from last 7 days   Lab Units 24  1117   WBC Thousand/uL 3.08*   HEMOGLOBIN g/dL 12.0   PLATELETS Thousands/uL 247           Imaging Studies:   I have personally reviewed pertinent imaging study reports and images in PACS.  Left facial CT reviewed  personally.  Soft tissue edema.  No abscess.    EKG, Pathology, and Other Studies:   I have personally reviewed pertinent reports.

## 2024-04-26 NOTE — PLAN OF CARE
Problem: Prexisting or High Potential for Compromised Skin Integrity  Goal: Skin integrity is maintained or improved  Description: INTERVENTIONS:  - Identify patients at risk for skin breakdown  - Assess and monitor skin integrity  - Assess and monitor nutrition and hydration status  - Monitor labs   - Assess for incontinence   - Turn and reposition patient  - Assist with mobility/ambulation  - Relieve pressure over bony prominences  - Avoid friction and shearing  - Provide appropriate hygiene as needed including keeping skin clean and dry  - Evaluate need for skin moisturizer/barrier cream  - Collaborate with interdisciplinary team   - Patient/family teaching  - Consider wound care consult   Outcome: Progressing     Problem: PAIN - ADULT  Goal: Verbalizes/displays adequate comfort level or baseline comfort level  Description: Interventions:  - Encourage patient to monitor pain and request assistance  - Assess pain using appropriate pain scale  - Administer analgesics based on type and severity of pain and evaluate response  - Implement non-pharmacological measures as appropriate and evaluate response  - Consider cultural and social influences on pain and pain management  - Notify physician/advanced practitioner if interventions unsuccessful or patient reports new pain  Outcome: Progressing     Problem: INFECTION - ADULT  Goal: Absence or prevention of progression during hospitalization  Description: INTERVENTIONS:  - Assess and monitor for signs and symptoms of infection  - Monitor lab/diagnostic results  - Monitor all insertion sites, i.e. indwelling lines, tubes, and drains  - Monitor endotracheal if appropriate and nasal secretions for changes in amount and color  - [place] appropriate cooling/warming therapies per order  - Administer medications as ordered  - Instruct and encourage patient and family to use good hand hygiene technique  - Identify and instruct in appropriate isolation precautions for  identified infection/condition  Outcome: Progressing  Goal: Absence of fever/infection during neutropenic period  Description: INTERVENTIONS:  - Monitor WBC    Outcome: Progressing

## 2024-04-26 NOTE — H&P
[facility]  H&P  Name: Ernestine Diggs 83 y.o. female I MRN: [id number]  Unit/Bed#: ED 26 I Date of Admission: 4/26/2024   Date of Service: 4/26/2024 I Hospital Day: 0      Assessment/Plan   * Sepsis (HCC)  Assessment & Plan  Suspected sepsis On admission due to fever/leukopenia/evidence of left-sided facial cellulitis.  Discussed with dermatology via Tiger text after sharing images, likely zoster  Swabbed lesion for HSV/VZV and sent wound/viral culture  Blood culture sent  CT consistent with facial cellulitis/no discrete collection  In ED patient received IV Rocephin/vancomycin and IV acyclovir  Continue with IV acyclovir for zoster  IV fluids ordered  Monitor for fever/white blood cell count and rash.    Facial cellulitis  Assessment & Plan  CT notes facial cellulitis/no discrete collection  Likely zoster  On IV acyclovir given patient's immunocompromised status [history of rheumatoid arthritis on methotrexate]  Follow-up on HSV/VZV PCR  Follow-up wound cultures/viral cultures  Per dermatology, patient can follow-up with ophthalmology outpatient/dermatology outpatient.  [facility] to admit to [facility].  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  Recent flare for which she was on prednisone taper [patient was currently on 5 mg daily x 5 days left in the course].    Primary hypertension  Assessment & Plan  Elevated on admission  Continue Cozaar.    Acquired hypothyroidism  Assessment & Plan  Continue Synthroid.    VTE Prophylaxis: Apixaban (Eliquis)  / sequential compression device   Code Status: level 1  POLST: POLST form is not discussed and not completed at this time.  Discussion with family: daughter in law    Anticipated Length of Stay:  Patient will be admitted on an Inpatient basis with an anticipated length of stay of  over 2 midnights.   Justification for Hospital Stay: iv  acyclovir    Total Time for Visit, including Counseling / Coordination of Care: 30 minutes.  Greater than 50% of this total time spent on direct patient counseling and coordination of care.    Chief Complaint:   fever, facial rash    History of Present Illness:    Ernestine Diggs is a 83 y.o. female with known history of rheumatoid arthritis with recent flare on weekly methotrexate/prednisone taper, history of hypertension, hypothyroidism who presented to the ED with symptoms of left sided facial rash which started 5 days ago.  They initially started as lesions on her upper lip and over the last 24 hours she has noticed increased swelling/redness involving her left cheek.  She denies any lesions inside her mouth.  Denies any visual changes/hearing loss.  She did report having a fever 101.8 while being transported via EMS.  Currently she denies any complaints of pain/numbness/tingling.    Case discussed by ED provider and myself with dermatology on-call, likely zoster, recommending IV acyclovir and outpatient follow-up with ophthalmology/dermatology.  Patient is okay for admission to [facility].    Review of Systems:    Review of Systems   Constitutional:  Positive for fever. Negative for chills.   HENT:  Positive for facial swelling. Negative for ear discharge, ear pain, sore throat, trouble swallowing and voice change.    Eyes:  Negative for pain and visual disturbance.   Respiratory:  Negative for cough and shortness of breath.    Cardiovascular:  Negative for chest pain and palpitations.   Gastrointestinal:  Negative for abdominal pain and vomiting.   Genitourinary:  Negative for dysuria and hematuria.   Musculoskeletal:  Negative for arthralgias and back pain.   Skin:  Positive for rash.   Neurological:  Negative for seizures and syncope.   All other systems reviewed and are negative.      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    Meds/Allergies:    Prior to Admission medications    Medication Sig Start Date End Date Taking? Authorizing Provider   apixaban (ELIQUIS) 5 mg Take 5 mg by mouth 2 (two) times a day    Historical Provider, MD   folic acid (FOLVITE) 1 mg tablet Take 1 mg by mouth daily    Historical Provider, MD   gabapentin (NEURONTIN) 300 mg capsule Take 300 mg by mouth 2 (two) times a day    Historical Provider, MD   levothyroxine 25 mcg tablet Take 25 mcg by mouth daily    Historical Provider, MD   losartan (COZAAR) 50 mg tablet Take 50 mg by mouth daily    Historical Provider, MD   methotrexate 2.5 mg tablet Take 20 mg by mouth once a week Take 8 tabs    Historical Provider, MD   pantoprazole (PROTONIX) 40 mg tablet Take 40 mg by mouth daily    Historical Provider, MD   pravastatin (PRAVACHOL) 40 mg tablet Take 40 mg by mouth daily    Historical Provider, MD   sulfaSALAzine (AZULFIDINE) 500 mg tablet Take 500 mg by mouth 3 (three) times a day    Historical Provider, MD RECINOS have reviewed home medications using allscripts.    Allergies:   Allergies   Allergen Reactions    Aceinhibitors [Ace Inhibitors] Other (See Comments)     .    Atenolol Other (See Comments)     .       Social History:     Marital Status:      Substance Use History:   Social History     Substance and Sexual Activity   Alcohol Use Not on file     Social History     Tobacco Use   Smoking Status Not on file   Smokeless Tobacco Not on file     Social History     Substance and Sexual Activity   Drug Use Not on file       Family History:    non-contributory    Physical Exam:     Vitals:   Blood Pressure: (!) 175/78 (04/26/24 1430)  Pulse: 71 (04/26/24 1430)  Temperature: 100 °F (37.8 °C) (04/26/24 1054)  Temp Source: Oral (04/26/24 1054)  Respirations: 16 (04/26/24 1430)  Height: 5' (152.4  cm) (04/26/24 1139)  Weight - Scale: 60.6 kg (133 lb 9.6 oz) (04/26/24 1139)  SpO2: 98 % (04/26/24 1430)    Physical Exam  Vitals reviewed.   Constitutional:       General: She is not in acute distress.     Appearance: She is not ill-appearing.   HENT:      Head: Normocephalic and atraumatic.      Nose: Nose normal. No congestion.      Mouth/Throat:      Mouth: Mucous membranes are dry.      Pharynx: Oropharynx is clear.   Eyes:      Conjunctiva/sclera: Conjunctivae normal.      Pupils: Pupils are equal, round, and reactive to light.   Cardiovascular:      Rate and Rhythm: Normal rate and regular rhythm.      Pulses: Normal pulses.   Pulmonary:      Effort: Pulmonary effort is normal. No respiratory distress.      Breath sounds: Normal breath sounds. No wheezing or rales.   Abdominal:      General: Abdomen is flat. Bowel sounds are normal. There is no distension.      Palpations: Abdomen is soft.      Tenderness: There is no abdominal tenderness. There is no guarding.   Musculoskeletal:         General: No swelling. Normal range of motion.      Cervical back: Normal range of motion and neck supple.   Skin:     Findings: No rash.      Comments: Left cheek erythema noted. Also noted blisters on left angle of lip/upper cheek  No mucosal lesions inside the mouth   Neurological:      General: No focal deficit present.      Mental Status: She is alert and oriented to person, place, and time.   Psychiatric:         Mood and Affect: Mood normal.       Additional Data:     Lab Results: I have personally reviewed pertinent reports.      Results from last 7 days   Lab Units 04/26/24  1117   WBC Thousand/uL 3.08*   HEMOGLOBIN g/dL 12.0   HEMATOCRIT % 35.7   PLATELETS Thousands/uL 247   BANDS PCT % 1   LYMPHO PCT % 29   MONO PCT % 8   EOS PCT % 0     Results from last 7 days   Lab Units 04/26/24  1117   SODIUM mmol/L 133*   POTASSIUM mmol/L 3.9   CHLORIDE mmol/L 96   CO2 mmol/L 24   BUN mg/dL 13   CREATININE mg/dL 0.61   ANION  GAP mmol/L 13   CALCIUM mg/dL 9.2   ALBUMIN g/dL 4.3   TOTAL BILIRUBIN mg/dL 0.49   ALK PHOS U/L 66   ALT U/L 10   AST U/L 34   GLUCOSE RANDOM mg/dL 110     Results from last 7 days   Lab Units 04/26/24  1117   INR  4.26*             Results from last 7 days   Lab Units 04/26/24  1117   LACTIC ACID mmol/L 1.2   PROCALCITONIN ng/ml 0.36*       Imaging: I have personally reviewed pertinent reports.      CT facial bones with contrast   Final Result by Rivera Henry MD (04/26 1246)      Left-sided facial cellulitis. No discrete rim-enhancing collections identified.         Workstation performed: [id number]             [facility] Yes     ** Please Note: This note has been constructed using a voice recognition system. **

## 2024-04-26 NOTE — ASSESSMENT & PLAN NOTE
On weekly methotrexate.  Recent flare for which she was on prednisone taper [patient was currently on 5 mg daily x 5 days left in the course].

## 2024-04-26 NOTE — ASSESSMENT & PLAN NOTE
CT notes facial cellulitis/no discrete collection  Likely zoster  On IV acyclovir given patient's immunocompromised status [history of rheumatoid arthritis on methotrexate]  Follow-up on HSV/VZV PCR  Follow-up wound cultures/viral cultures  Per dermatology, patient can follow-up with ophthalmology outpatient/dermatology outpatient.  Okay to admit to [facility].  Requesting ID consult

## 2024-04-26 NOTE — ED PROVIDER NOTES
"History  Chief Complaint   Patient presents with    Rash     Pt came by EMS from home with rash, facial swelling, and blisters that \"just showed up this morning.\"      84yo female with a history of rheumatoid arthritis on methotrexate and prednisone, hypertension, and DVT on Eliquis presenting via EMS for evaluation of a rash. Symptoms began yesterday. She started yesterday with a small amount of scabbing and then woke up today with redness and blistering to the left side of her face. The rash is not painful or itchy. She had a fever of 101.8 for EMS. She is otherwise asymptomatic. She denies any pain or visual disturbance of the left eye. No new products/medications. No exposure to similar rash.      History provided by:  Patient, EMS personnel and relative   used: No        Prior to Admission Medications   Prescriptions Last Dose Informant Patient Reported? Taking?   apixaban (ELIQUIS) 5 mg   Yes No   Sig: Take 5 mg by mouth 2 (two) times a day   folic acid (FOLVITE) 1 mg tablet   Yes No   Sig: Take 1 mg by mouth daily   gabapentin (NEURONTIN) 300 mg capsule   Yes No   Sig: Take 300 mg by mouth 2 (two) times a day   levothyroxine 25 mcg tablet   Yes No   Sig: Take 25 mcg by mouth daily   losartan (COZAAR) 50 mg tablet   Yes No   Sig: Take 50 mg by mouth daily   methotrexate 2.5 mg tablet   Yes No   Sig: Take 20 mg by mouth once a week Take 8 tabs   pantoprazole (PROTONIX) 40 mg tablet   Yes No   Sig: Take 40 mg by mouth daily   pravastatin (PRAVACHOL) 40 mg tablet   Yes No   Sig: Take 40 mg by mouth daily   sulfaSALAzine (AZULFIDINE) 500 mg tablet   Yes No   Sig: Take 500 mg by mouth 3 (three) times a day      Facility-Administered Medications: None       Past Medical History:   Diagnosis Date    Hypertension     Insomnia 3/18/2022    Molluscum contagiosum 3/10/2022    Nasal congestion 3/11/2022    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    No family history on file.  I have reviewed and agree with the history as documented.    E-Cigarette/Vaping     E-Cigarette/Vaping Substances          Review of Systems   Constitutional:  Positive for fever. Negative for chills.   HENT:  Negative for drooling and voice change.    Eyes:  Negative for discharge and redness.   Respiratory:  Negative for shortness of breath and stridor.    Cardiovascular:  Negative for chest pain and leg swelling.   Gastrointestinal:  Negative for nausea and vomiting.   Musculoskeletal:  Negative for neck pain and neck stiffness.   Skin:  Positive for rash. Negative for color change.   Neurological:  Negative for seizures and syncope.   Psychiatric/Behavioral:  Negative for confusion. The patient is not nervous/anxious.    All other systems reviewed and are negative.      Physical Exam  Physical Exam  Vitals and nursing note reviewed.   Constitutional:       General: She is not in acute distress.     Appearance: Normal appearance. She is not toxic-appearing.   HENT:      Head: Normocephalic and atraumatic.      Comments: Crusting noted to L upper lip. Scattered bullae to L maxillary region with underlying erythema. There is erythematous lesions to hard palate and L buccal surface. No skin sloughing.      Right Ear: External ear normal.      Left Ear: Tympanic membrane, ear canal and external ear normal.      Mouth/Throat:      Mouth: Mucous membranes are moist.      Pharynx: Oropharynx is clear. No oropharyngeal exudate.   Eyes:      General: No scleral icterus.        Right eye: No discharge.         Left eye: No discharge.      Conjunctiva/sclera: Conjunctivae normal.      Comments: No conjunctival injection of L eye. L eye pressure 19 mmHg. No dendritic lesions seen on fluorescein stain.    Cardiovascular:      Rate and Rhythm: Normal rate.   Pulmonary:      Effort: Pulmonary effort is normal. No respiratory distress.      Breath sounds: No stridor.   Musculoskeletal:          General: No deformity. Normal range of motion.      Cervical back: Normal range of motion.   Skin:     General: Skin is warm and dry.   Neurological:      General: No focal deficit present.      Mental Status: She is alert. Mental status is at baseline.   Psychiatric:         Mood and Affect: Mood normal.         Behavior: Behavior normal.                        Vital Signs  ED Triage Vitals [04/26/24 1054]   Temperature Pulse Respirations Blood Pressure SpO2   100 °F (37.8 °C) 79 18 (!) 204/90 99 %      Temp Source Heart Rate Source Patient Position - Orthostatic VS BP Location FiO2 (%)   Oral Monitor Lying Right arm --      Pain Score       --           Vitals:    04/26/24 1215 04/26/24 1300 04/26/24 1339 04/26/24 1430   BP: (!) 200/87 (!) 177/82 (!) 194/85 (!) 175/78   Pulse: 78 76 73 71   Patient Position - Orthostatic VS: Lying Lying Lying Lying         Visual Acuity      ED Medications  Medications   vancomycin (VANCOCIN) 1500 mg in sodium chloride 0.9% 250 mL IVPB (has no administration in time range)   acyclovir (ZOVIRAX) 450 mg in sodium chloride 0.9 % 100 mL IVPB (has no administration in time range)   fluorescein sodium sterile ophthalmic strip 1 strip (1 strip Left Eye Given by Other 4/26/24 1117)   tetracaine 0.5 % ophthalmic solution 2 drop (2 drops Left Eye Given by Other 4/26/24 1117)   iohexol (OMNIPAQUE) 350 MG/ML injection (MULTI-DOSE) 85 mL (85 mL Intravenous Given 4/26/24 1200)   ceftriaxone (ROCEPHIN) 2 g/50 mL in dextrose IVPB (0 mg Intravenous Stopped 4/26/24 1406)       Diagnostic Studies  Results Reviewed       Procedure Component Value Units Date/Time    HSV TYPE 1,2 DNA PCR [[id number]] Collected: 04/26/24 1406    Lab Status: In process Specimen: Swab from Other Updated: 04/26/24 1416    Virus culture [[id number]] Collected: 04/26/24 1408    Lab Status: In process Specimen: Lip Updated: 04/26/24 1416    Varicella Zoster Virus DNA,PCR [[id number]] Collected: 04/26/24 1406    Lab Status: In  process Specimen: Swab from Other Updated: 04/26/24 1416    Wound culture and Gram stain [[id number]] Collected: 04/26/24 1406    Lab Status: In process Specimen: Wound from Cheek Updated: 04/26/24 1416    RBC Morphology Reflex Test [[id number]] Collected: 04/26/24 1117    Lab Status: Final result Specimen: Blood from Arm, Right Updated: 04/26/24 1302    Blood culture #1 [[id number]] Collected: 04/26/24 1234    Lab Status: In process Specimen: Blood from Arm, Right Updated: 04/26/24 1239    CBC and differential [[id number]]  (Abnormal) Collected: 04/26/24 1117    Lab Status: Final result Specimen: Blood from Arm, Right Updated: 04/26/24 1209     WBC 3.08 Thousand/uL      RBC 4.04 Million/uL      Hemoglobin 12.0 g/dL      Hematocrit 35.7 %      MCV 88 fL      MCH 29.7 pg      MCHC 33.6 g/dL      RDW 15.1 %      MPV 8.1 fL      Platelets 247 Thousands/uL     Narrative:      This is an appended report.  These results have been appended to a previously verified report.    Manual Differential(PHLEBS Do Not Order) [[id number]]  (Abnormal) Collected: 04/26/24 1117    Lab Status: Final result Specimen: Blood from Arm, Right Updated: 04/26/24 1209     Segmented % 60 %      Bands % 1 %      Lymphocytes % 29 %      Monocytes % 8 %      Eosinophils % 0 %      Basophils % 0 %      Atypical Lymphocytes % 2 %      Absolute Neutrophils 1.88 Thousand/uL      Absolute Lymphocytes 0.95 Thousand/uL      Absolute Monocytes 0.25 Thousand/uL      Absolute Eosinophils 0.00 Thousand/uL      Absolute Basophils 0.00 Thousand/uL      Total Counted --     RBC Morphology Present     Rouleaux Present     Platelet Estimate Adequate     Anisocytosis Present    Protime-INR [[id number]]  (Abnormal) Collected: 04/26/24 1117    Lab Status: Final result Specimen: Blood from Arm, Right Updated: 04/26/24 1157     Protime 42.1 seconds      INR 4.26    APTT [[id number]]  (Abnormal) Collected: 04/26/24 1117    Lab Status: Final result Specimen: Blood from Arm,  Right Updated: 04/26/24 1157     PTT 84 seconds     Procalcitonin [[id number]]  (Abnormal) Collected: 04/26/24 1117    Lab Status: Final result Specimen: Blood from Arm, Right Updated: 04/26/24 1152     Procalcitonin 0.36 ng/ml     Comprehensive metabolic panel [[id number]]  (Abnormal) Collected: 04/26/24 1117    Lab Status: Final result Specimen: Blood from Arm, Right Updated: 04/26/24 1145     Sodium 133 mmol/L      Potassium 3.9 mmol/L      Chloride 96 mmol/L      CO2 24 mmol/L      ANION GAP 13 mmol/L      BUN 13 mg/dL      Creatinine 0.61 mg/dL      Glucose 110 mg/dL      Calcium 9.2 mg/dL      AST 34 U/L      ALT 10 U/L      Alkaline Phosphatase 66 U/L      Total Protein 8.5 g/dL      Albumin 4.3 g/dL      Total Bilirubin 0.49 mg/dL      eGFR 84 ml/min/1.73sq m     Narrative:      National Kid[employer] guidelines for Chronic Kidney Disease (CKD):     Stage 1 with normal or high GFR (GFR > 90 mL/min/1.73 square meters)    Stage 2 Mild CKD (GFR = 60-89 mL/min/1.73 square meters)    Stage 3A Moderate CKD (GFR = 45-59 mL/min/1.73 square meters)    Stage 3B Moderate CKD (GFR = 30-44 mL/min/1.73 square meters)    Stage 4 Severe CKD (GFR = 15-29 mL/min/1.73 square meters)    Stage 5 End Stage CKD (GFR <15 mL/min/1.73 square meters)  Note: GFR calculation is accurate only with a steady state creatinine    Lactic acid [[id number]]  (Normal) Collected: 04/26/24 1117    Lab Status: Final result Specimen: Blood from Arm, Right Updated: 04/26/24 1141     LACTIC ACID 1.2 mmol/L     Narrative:      Result may be elevated if tourniquet was used during collection.    Blood culture #2 [[id number]] Collected: 04/26/24 1117    Lab Status: In process Specimen: Blood from Arm, Right Updated: 04/26/24 1124                   CT facial bones with contrast   Final Result by Rivera Henry MD (04/26 1246)      Left-sided facial cellulitis. No discrete rim-enhancing collections identified.         Workstation performed: [id number]                     Procedures  Procedures         ED Course  ED Course as of 04/26/24 1449   Fri Apr 26, 2024   1142 Dermatology paged.    1424 D/w Dr. Frederick Bhakta, dermatology resident. Dermatology suspecting zoster. Dr. Bhakta recommends swab for HSV/VZV, bacterial, and viral cultures. Patient may be admitted here at [facility]. No need to transfer for derm consult.                                SBIRT 20yo+      Flowsheet Row Most Recent Value   Initial Alcohol Screen: US AUDIT-C     1. How often do you have a drink containing alcohol? 0 Filed at: 04/26/2024 1057   2. How many drinks containing alcohol do you have on a typical day you are drinking?  0 Filed at: 04/26/2024 1057   3b. FEMALE Any Age, or MALE 65+: How often do you have 4 or more drinks on one occassion? 0 Filed at: 04/26/2024 1057   Audit-C Score 0 Filed at: 04/26/2024 1057   LESIA: How many times in the past year have you...    Used an illegal drug or used a prescription medication for non-medical reasons? Never Filed at: 04/26/2024 1057                      Medical Decision Making  83yoF here with a L facial rash x 1 day with a fever. Temperature of 101.8 prehospital. On exam, there are scattered bullae with underlying erythema to the L maxillary region. There are also erythematous lesions in the mouth. Rash is not painful or itchy.     Initial ED plan: Check septic workup and CT facial bones. Will discuss with dermatology.    Final assessment: Labs reveal a mild neutropenia with a WBC of 3. Lactate is normal. CT shows evidence of facial cellulitis. Per discussion with dermatology, rash is consistent with herpes zoster. Viral and wound cultures obtained. She was started empirically on Rocephin, vancomycin, and acyclovir. She was admitted for further management.      Problems Addressed:  Facial rash: acute illness or injury  Sepsis (HCC): acute illness or injury    Amount and/or Complexity of Data Reviewed  Labs: ordered.  Radiology:  ordered.    Risk  Prescription drug management.  Decision regarding hospitalization.             Disposition  Final diagnoses:   Facial rash   Sepsis (HCC)     Time reflects when diagnosis was documented in both MDM as applicable and the Disposition within this note       Time User Action Codes Description Comment    4/26/2024  2:48 PM Denisa Aguilar Add [R21] Facial rash     4/26/2024  2:49 PM Denisa Aguilar Add [A41.9] Sepsis (HCC)           ED Disposition       ED Disposition   Admit    Condition   Stable    Date/Time   Fri Apr 26, 2024 5767    Comment   Case was discussed with Dr. Scott and the patient's admission status was agreed to be Admission Status: inpatient status to the service of Dr. Scott .               Follow-up Information    None         Patient's Medications   Discharge Prescriptions    No medications on file       No discharge procedures on file.    PDMP Review       None            ED Provider  Electronically Signed by             Denisa Aguilar PA-C  04/26/24 7857

## 2024-04-26 NOTE — CONSULTS
DERMATOLOGY:  CONSULTATION   Ernestine Diggs 83 y.o. female MRN: [id number]  Unit/Bed#: ED 26 Encounter: [id number]      Consults        Resident and Attending present VIRTUALLY      Assessment/Recommendations   Based on a thorough discussion of this condition and the management approach to it (including a comprehensive discussion of the known risks, side effects and potential benefits of treatment), the patient (family) agrees to implement the following specific plan:    Rash most consistent with herpes zoster. CT with facial cellulitis. Fever 101 on presentation, WBC 3.08. Recommend acyclovir and follow-up with ophthalmology given location near eye. Recommend swab for HSV/VZV as well as bacterial and viral cultures. Patient can follow-up in our outpatient [facility].      Please set up discharge follow up by calling our office: [phone])     Thank you for involving me in the care of your patient. Please call with questions, change in clinical status or if tests recommended above are abnormal.     Discussed with the primary service.      History:     HISTORY OF PRESENT ILLNESS:    Reason for Consult: Rash  HPI: Ernestine Diggs is a 83 y.o. year old female with hx monoclonal gammopathy, and RA on methotrexate and prednisone presenting with edema and erythematous patch with yellow crust and bullae on L central face. Patient states rash began as crust a few days ago and progressed. Endorses pain and fever. Denies new medications or exposures.      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    FAMILY HISTORY:  Non-contributory    SOCIAL HISTORY:  Social History     Social History     Substance and Sexual Activity   Alcohol Use Not on file      Social History     Substance and Sexual Activity   Drug Use Not on file     Social History     Tobacco Use   Smoking Status Not on file   Smokeless Tobacco Not on file       ALLERGIES:  Allergies   Allergen Reactions    Aceinhibitors [Ace Inhibitors] Other (See Comments)     .    Atenolol Other (See Comments)     .       MEDICATIONS:  All current active medications have been reviewed.       Physical exam:     Temp:  [98.5 °F (36.9 °C)-100 °F (37.8 °C)] 98.5 °F (36.9 °C)  HR:  [71-79] 74  Resp:  [16-18] 18  BP: (175-204)/(78-90) 186/86  SpO2:  [95 %-100 %] 98 %  Temp (24hrs), Av.3 °F (37.4 °C), Min:98.5 °F (36.9 °C), Max:100 °F (37.8 °C)  Current: Temperature: 98.5 °F (36.9 °C)    PSYCH: Normal mood and affect  EYES: Normal conjunctiva  ENT: Normal lips and oral mucosa  CARDIOVASCULAR: No edema  RESPIRATORY: Normal respirations  HEME/LYMPH/IMMUNO:  No regional lymphadenopathy except as noted below in ASSESSMENT AND PLAN BY DIAGNOSIS    FULL ORGAN SYSTEM SKIN EXAM (SKIN)  Hair, Scalp, Ears, Face Normal except as noted below in Assessment   Neck, Cervical Chain Nodes Normal except as noted below in Assessment   Right Arm/Hand/Fingers Normal except as noted below in Assessment   Left Arm/Hand/Fingers Normal except as noted below in Assessment   Chest/Breasts/Axillae Viewed areas Normal except as noted below in Assessment   Abdomen, Umbilicus Normal except as noted below in Assessment   Back/Spine Normal except as noted below in Assessment   Groin/Genitalia/Buttocks Viewed areas Normal except as noted below in Assessment   Right Leg, Foot, Toes Normal except as noted below in Assessment   Left Leg, Foot, Toes Normal except as noted below in Assessment        Skin: Edema and erythematous patch with yellow crust and bullae on L central face                        Labs, Imaging, & Other Studies     Lab Results:  I have personally reviewed pertinent labs.     Results from last 7 days   Lab Units 24  1117   WBC  Thousand/uL 3.08*   HEMOGLOBIN g/dL 12.0   PLATELETS Thousands/uL 247     Results from last 7 days   Lab Units 04/26/24  1117   POTASSIUM mmol/L 3.9   CHLORIDE mmol/L 96   CO2 mmol/L 24   BUN mg/dL 13   CREATININE mg/dL 0.61   EGFR ml/min/1.73sq m 84   CALCIUM mg/dL 9.2   AST U/L 34   ALT U/L 10   ALK PHOS U/L 66               Pathology:   I have personally reviewed pertinent reports.

## 2024-04-26 NOTE — ASSESSMENT & PLAN NOTE
Suspected sepsis On admission due to fever/leukopenia/evidence of left-sided facial cellulitis.  Discussed with dermatology via [facility] text after sharing images, likely zoster  Swabbed lesion for HSV/VZV and sent wound/viral culture  Blood culture sent  CT consistent with facial cellulitis/no discrete collection  In ED patient received IV Rocephin/vancomycin and IV acyclovir  Continue with IV acyclovir for zoster  IV fluids ordered  Monitor for fever/white blood cell count and rash.

## 2024-04-27 LAB
ANION GAP SERPL CALCULATED.3IONS-SCNC: 10 MMOL/L (ref 4–13)
BASOPHILS # BLD AUTO: 0.03 THOUSANDS/ÂΜL (ref 0–0.1)
BASOPHILS NFR BLD AUTO: 1 % (ref 0–1)
BUN SERPL-MCNC: 14 MG/DL (ref 5–25)
CALCIUM SERPL-MCNC: 8.5 MG/DL (ref 8.4–10.2)
CHLORIDE SERPL-SCNC: 98 MMOL/L (ref 96–108)
CO2 SERPL-SCNC: 27 MMOL/L (ref 21–32)
CREAT SERPL-MCNC: 0.65 MG/DL (ref 0.6–1.3)
EOSINOPHIL # BLD AUTO: 0.01 THOUSAND/ÂΜL (ref 0–0.61)
EOSINOPHIL NFR BLD AUTO: 0 % (ref 0–6)
ERYTHROCYTE [DISTWIDTH] IN BLOOD BY AUTOMATED COUNT: 14.6 % (ref 11.6–15.1)
GFR SERPL CREATININE-BSD FRML MDRD: 82 ML/MIN/1.73SQ M
GLUCOSE SERPL-MCNC: 94 MG/DL (ref 65–140)
HCT VFR BLD AUTO: 31.7 % (ref 34.8–46.1)
HCT VFR BLD AUTO: 32.1 % (ref 34.8–46.1)
HGB BLD-MCNC: 10.2 G/DL (ref 11.5–15.4)
HGB BLD-MCNC: 10.4 G/DL (ref 11.5–15.4)
IMM GRANULOCYTES # BLD AUTO: 0.01 THOUSAND/UL (ref 0–0.2)
IMM GRANULOCYTES NFR BLD AUTO: 0 % (ref 0–2)
INR PPP: 5.14 (ref 0.84–1.19)
LYMPHOCYTES # BLD AUTO: 1.13 THOUSANDS/ÂΜL (ref 0.6–4.47)
LYMPHOCYTES NFR BLD AUTO: 35 % (ref 14–44)
MCH RBC QN AUTO: 28.8 PG (ref 26.8–34.3)
MCHC RBC AUTO-ENTMCNC: 32.2 G/DL (ref 31.4–37.4)
MCV RBC AUTO: 90 FL (ref 82–98)
MONOCYTES # BLD AUTO: 0.57 THOUSAND/ÂΜL (ref 0.17–1.22)
MONOCYTES NFR BLD AUTO: 18 % (ref 4–12)
NEUTROPHILS # BLD AUTO: 1.5 THOUSANDS/ÂΜL (ref 1.85–7.62)
NEUTS SEG NFR BLD AUTO: 46 % (ref 43–75)
NRBC BLD AUTO-RTO: 0 /100 WBCS
PLATELET # BLD AUTO: 212 THOUSANDS/UL (ref 149–390)
PMV BLD AUTO: 8.7 FL (ref 8.9–12.7)
POTASSIUM SERPL-SCNC: 3.4 MMOL/L (ref 3.5–5.3)
PROCALCITONIN SERPL-MCNC: 0.59 NG/ML
PROTHROMBIN TIME: 48.6 SECONDS (ref 11.6–14.5)
RBC # BLD AUTO: 3.54 MILLION/UL (ref 3.81–5.12)
SODIUM SERPL-SCNC: 135 MMOL/L (ref 135–147)
WBC # BLD AUTO: 3.25 THOUSAND/UL (ref 4.31–10.16)

## 2024-04-27 PROCEDURE — 99233 SBSQ HOSP IP/OBS HIGH 50: CPT | Performed by: STUDENT IN AN ORGANIZED HEALTH CARE EDUCATION/TRAINING PROGRAM

## 2024-04-27 PROCEDURE — 80048 BASIC METABOLIC PNL TOTAL CA: CPT | Performed by: FAMILY MEDICINE

## 2024-04-27 PROCEDURE — 85014 HEMATOCRIT: CPT | Performed by: INTERNAL MEDICINE

## 2024-04-27 PROCEDURE — 85025 COMPLETE CBC W/AUTO DIFF WBC: CPT | Performed by: FAMILY MEDICINE

## 2024-04-27 PROCEDURE — 85018 HEMOGLOBIN: CPT | Performed by: INTERNAL MEDICINE

## 2024-04-27 PROCEDURE — 85610 PROTHROMBIN TIME: CPT | Performed by: FAMILY MEDICINE

## 2024-04-27 PROCEDURE — 84145 PROCALCITONIN (PCT): CPT | Performed by: FAMILY MEDICINE

## 2024-04-27 PROCEDURE — 99233 SBSQ HOSP IP/OBS HIGH 50: CPT | Performed by: INTERNAL MEDICINE

## 2024-04-27 RX ORDER — HYDRALAZINE HYDROCHLORIDE 20 MG/ML
10 INJECTION INTRAMUSCULAR; INTRAVENOUS EVERY 6 HOURS PRN
Status: DISCONTINUED | OUTPATIENT
Start: 2024-04-27 | End: 2024-05-01 | Stop reason: HOSPADM

## 2024-04-27 RX ADMIN — PRAVASTATIN SODIUM 40 MG: 40 TABLET ORAL at 08:08

## 2024-04-27 RX ADMIN — SODIUM CHLORIDE, SODIUM GLUCONATE, SODIUM ACETATE, POTASSIUM CHLORIDE, MAGNESIUM CHLORIDE, SODIUM PHOSPHATE, DIBASIC, AND POTASSIUM PHOSPHATE 75 ML/HR: .53; .5; .37; .037; .03; .012; .00082 INJECTION, SOLUTION INTRAVENOUS at 20:24

## 2024-04-27 RX ADMIN — ACYCLOVIR SODIUM 450 MG: 50 INJECTION, SOLUTION INTRAVENOUS at 23:11

## 2024-04-27 RX ADMIN — ACYCLOVIR SODIUM 450 MG: 50 INJECTION, SOLUTION INTRAVENOUS at 05:00

## 2024-04-27 RX ADMIN — LEVOTHYROXINE SODIUM 25 MCG: 25 TABLET ORAL at 05:00

## 2024-04-27 RX ADMIN — PANTOPRAZOLE SODIUM 40 MG: 40 TABLET, DELAYED RELEASE ORAL at 08:08

## 2024-04-27 RX ADMIN — ACYCLOVIR SODIUM 450 MG: 50 INJECTION, SOLUTION INTRAVENOUS at 15:12

## 2024-04-27 RX ADMIN — SODIUM CHLORIDE, SODIUM GLUCONATE, SODIUM ACETATE, POTASSIUM CHLORIDE, MAGNESIUM CHLORIDE, SODIUM PHOSPHATE, DIBASIC, AND POTASSIUM PHOSPHATE 75 ML/HR: .53; .5; .37; .037; .03; .012; .00082 INJECTION, SOLUTION INTRAVENOUS at 04:58

## 2024-04-27 RX ADMIN — LOSARTAN POTASSIUM 50 MG: 50 TABLET, FILM COATED ORAL at 08:08

## 2024-04-27 RX ADMIN — PREDNISONE 5 MG: 5 TABLET ORAL at 08:08

## 2024-04-27 NOTE — PROGRESS NOTES
Progress Note - Infectious Disease   Ernestine Diggs 83 y.o. female MRN: [id number]  Unit/Bed#: -01 Encounter: [id number]      Impression/Plan:    1.  Left facial zoster, in the distribution of the left facial nerve, with Bell's palsy.  Patient has no headache or visual/auditory difficulties.  No ocular or ear lesions.  Low-grade fever is secondary to zoster.  There is no evidence of skin or cellulitis clinically.  She is clinically and systemically well, without evidence of sepsis or systemic toxicity.  Blood cultures were drawn on admission.  Left facial CT is without subcutaneous abscess.  Lesions remain vesicular  -Continue IV acyclovir  -Monitor rash for crusting  -When rash begins to crust, can transition to p.o. Valtrex.  -Repeat CBC and creatinine tomorrow to monitor for drug toxicity from IV acyclovir  -Recommend zoster vaccination in 3 to 4 months  -Since the patient is on chronic low-dose prednisone and Bell's palsy appears to already be improving, can hold off on additional steroids.  If any worsening signs of Bell's palsy, would increase prednisone to 40 mg daily x 5 days     2.  Leukopenia, may be all secondary to acute viral infection but also consider medication effect due to methotrexate.  -Monitor WBC     3.   Left-sided Bell's palsy, secondary to zoster.  Improving  -Monitor     4.  RA, on weekly methotrexate and intermittent prednisone for flare.  Patient currently had prednisone started.  This is risk factor for zoster above.    Above management plan to continue IV acyclovir discussed with the SLIM Attending who is in agreement. ID will follow.    Antibiotics:  IV acyclovir     Subjective:  The patient is feeling okay today, denies any left-sided facial pain.  She still has vesicular lesions over the left side of the face.  She is afebrile today.  No complaints.    Objective:  Vitals:  Temp:  [97.4 °F (36.3 °C)-98.7 °F (37.1 °C)] 98.7 °F (37.1 °C)  HR:  [56-70] 61  Resp:  [16-18] 18  BP:  ()/(39-80) 165/69  SpO2:  [95 %-99 %] 97 %  Temp (24hrs), Av °F (36.7 °C), Min:97.4 °F (36.3 °C), Max:98.7 °F (37.1 °C)  Current: Temperature: 98.7 °F (37.1 °C)    Physical Exam:   General Appearance:  Alert, interactive, nontoxic, no acute distress.   Throat: Oropharynx moist without lesions.    Lungs:   Clear to auscultation bilaterally; no wheezes, rhonchi or rales; respirations unlabored   Heart:  RRR; no murmur, rub or gallop   Abdomen:   Soft, non-tender, non-distended, positive bowel sounds.     Extremities: No clubbing, cyanosis or edema   Skin: Vesicular lesions above the mouth extending to the left cheek.  Mild surrounding erythema.  Patient able to close her eyes and symmetric smile.  No lesions in the ear.       Labs:   All pertinent labs and imaging studies were personally reviewed  Results from last 7 days   Lab Units 24  0437 24  1117   WBC Thousand/uL 3.25* 3.08*   HEMOGLOBIN g/dL 10.2* 12.0   PLATELETS Thousands/uL 212 247     Results from last 7 days   Lab Units 24  0437 24  1117   SODIUM mmol/L 135 133*   POTASSIUM mmol/L 3.4* 3.9   CHLORIDE mmol/L 98 96   CO2 mmol/L 27 24   BUN mg/dL 14 13   CREATININE mg/dL 0.65 0.61   EGFR ml/min/1.73sq m 82 84   CALCIUM mg/dL 8.5 9.2   AST U/L  --  34   ALT U/L  --  10   ALK PHOS U/L  --  66     Results from last 7 days   Lab Units 24  0437 24  1117   PROCALCITONIN ng/ml 0.59* 0.36*                   Micro:  Results from last 7 days   Lab Units 24  1406 24  1234 24  1117   BLOOD CULTURE   --  No Growth at 24 hrs. No Growth at 24 hrs.   GRAM STAIN RESULT  No Polys or Bacteria seen  --   --    WOUND CULTURE  No growth  --   --

## 2024-04-27 NOTE — PROGRESS NOTES
[facility]   Progress Note  Name: Ernestine Diggs I  MRN: [id number]  Unit/Bed#: -01 I Date of Admission: 4/26/2024   Date of Service: 4/27/2024 I Hospital Day: 1    * Sepsis (HCC)  Assessment & Plan  Suspected sepsis On admission due to fever/leukopenia/evidence of left-sided facial cellulitis.  Discussed with dermatology via Tiger text after sharing images, likely zoster  Swabbed lesion for HSV/VZV and sent wound/viral culture    Recent Labs     04/26/24  1117 04/27/24  0437   WBC 3.08* 3.25*      Blood culture sent  CT consistent with facial cellulitis/no discrete collection  In ED patient received IV Rocephin/vancomycin and IV acyclovir  Continue with IV acyclovir for zoster  IV fluids ordered  Monitor for fever/white blood cell count and rash.    Facial cellulitis  Assessment & Plan  CT notes facial cellulitis/no discrete collection  Likely zoster  On IV acyclovir given patient's immunocompromised status [history of rheumatoid arthritis on methotrexate]  Follow-up on HSV/VZV PCR  Follow-up wound cultures/viral cultures  Per dermatology, patient can follow-up with ophthalmology outpatient/dermatology outpatient.  Okay to admit to [facility].  ID input appreciated  Continue IV Acyclovir  Monitor for crusting; once it does crust, transition to PO valtrex  Monitor creat on acyclovir  Recommend zoster vaccination in 3 to 4 months.       Acquired hypothyroidism  Assessment & Plan  Continue Synthroid    Primary hypertension  Assessment & Plan  Blood Pressure: (!) 180/80   Elevated on admission  Continue Cozaar  PRN hydral for SBP > 160    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)  Assessment & Plan  On weekly methotrexate.  Recent flare for which she was on prednisone taper [patient was currently on 5 mg daily x 5 days left in the course].        VTE Pharmacologic Prophylaxis: VTE Score: 5 High Risk (Score >/= 5) - Pharmacological DVT Prophylaxis  Contraindicated. Sequential Compression Devices Ordered.    Mobility:   Basic Mobility Inpatient Raw Score: 6  JH-HLM Goal: 2: Bed activities/Dependent transfer  JH-HLM Achieved: 2: Bed activities/Dependent transfer  JH-HLM Goal NOT achieved. Continue with multidisciplinary rounding and encourage appropriate mobility to improve upon JH-HLM goals.    Patient Centered Rounds: I performed bedside rounds with nursing staff today.  Discussions with Specialists or Other Care Team Provider:  IP CONSULT TO INFECTIOUS DISEASES      Education and Discussions with Family / Patient: patient     Total Time Spent on Date of Encounter in care of patient: 30+ mins. This time was spent on one or more of the following: performing physical exam; counseling and coordination of care; obtaining or reviewing history; documenting in the medical record; reviewing/ordering tests, medications or procedures; communicating with other healthcare professionals and discussing with patient's family/caregivers.    Current Length of Stay: 1 day(s)  Current Patient Status: Inpatient   Certification Statement: The patient will continue to require additional inpatient hospital stay due to plan as noted above  Discharge Plan: Anticipate discharge in 24-48 hrs to discharge location to be determined pending rehab evaluations.    Code Status: Level 1 - Full Code    Subjective:   Offers no new complaints at this time. Denies itching of perioral area. No acute events reported overnight. Understanding of plan.  All questions answered.    Objective:     Vitals:   Temp (24hrs), Av.3 °F (36.8 °C), Min:97.4 °F (36.3 °C), Max:99.5 °F (37.5 °C)    Temp:  [97.4 °F (36.3 °C)-99.5 °F (37.5 °C)] 97.4 °F (36.3 °C)  HR:  [56-78] 70  Resp:  [16-19] 17  BP: ()/(39-87) 180/80  SpO2:  [95 %-100 %] 98 %  Body mass index is 26.09 kg/m².     Input and Output Summary (last 24 hours):     Intake/Output Summary (Last 24 hours) at 2024 1119  Last data filed at  4/27/2024 0800  Gross per 24 hour   Intake 1116.25 ml   Output 0 ml   Net 1116.25 ml       Physical Exam:   Physical Exam  Vitals and nursing note reviewed.   Constitutional:       General: She is not in acute distress.     Appearance: She is ill-appearing. She is not toxic-appearing.   HENT:      Head: Normocephalic.      Comments: Blisters noted on left angle of upper lip     Nose: Nose normal.      Mouth/Throat:      Mouth: Mucous membranes are dry.   Eyes:      General: No scleral icterus.     Conjunctiva/sclera: Conjunctivae normal.   Cardiovascular:      Rate and Rhythm: Normal rate.      Pulses: Normal pulses.           Radial pulses are 2+ on the right side and 2+ on the left side.      Heart sounds: Normal heart sounds.   Pulmonary:      Effort: Pulmonary effort is normal.      Breath sounds: Normal breath sounds.   Abdominal:      General: Bowel sounds are normal. There is no distension.      Palpations: Abdomen is soft.      Tenderness: There is no abdominal tenderness.   Musculoskeletal:         General: No tenderness.   Skin:     General: Skin is dry.      Coloration: Skin is not jaundiced.      Findings: Lesion (lesions begnning to crust) present.   Neurological:      Mental Status: She is alert.   Psychiatric:         Mood and Affect: Mood normal.         Behavior: Behavior normal. Behavior is cooperative.          Additional Data:     Labs:  Results from last 7 days   Lab Units 04/27/24  0437 04/26/24  1117   WBC Thousand/uL 3.25* 3.08*   HEMOGLOBIN g/dL 10.2* 12.0   HEMATOCRIT % 31.7* 35.7   PLATELETS Thousands/uL 212 247   BANDS PCT %  --  1   SEGS PCT % 46  --    LYMPHO PCT % 35 29   MONO PCT % 18* 8   EOS PCT % 0 0     Results from last 7 days   Lab Units 04/27/24  0437 04/26/24  1117   SODIUM mmol/L 135 133*   POTASSIUM mmol/L 3.4* 3.9   CHLORIDE mmol/L 98 96   CO2 mmol/L 27 24   BUN mg/dL 14 13   CREATININE mg/dL 0.65 0.61   ANION GAP mmol/L 10 13   CALCIUM mg/dL 8.5 9.2   ALBUMIN g/dL  --  4.3    TOTAL BILIRUBIN mg/dL  --  0.49   ALK PHOS U/L  --  66   ALT U/L  --  10   AST U/L  --  34   GLUCOSE RANDOM mg/dL 94 110     Results from last 7 days   Lab Units 04/27/24  0437   INR  5.14*             Results from last 7 days   Lab Units 04/27/24  0437 04/26/24  1117   LACTIC ACID mmol/L  --  1.2   PROCALCITONIN ng/ml 0.59* 0.36*       Lines/Drains:  Invasive Devices       Peripheral Intravenous Line  Duration             Peripheral IV 04/26/24 Right Antecubital <1 day                          Imaging: Reviewed radiology reports from this admission including:   CT facial bones with contrast    Result Date: 4/26/2024  Impression: Left-sided facial cellulitis. No discrete rim-enhancing collections identified. Workstation performed: [id number]       No Chest XR results available for this patient.     No results found for this or any previous visit.      Recent Cultures (last 7 days):   Results from last 7 days   Lab Units 04/26/24  1406 04/26/24  1234 04/26/24  1117   BLOOD CULTURE   --  Received in Microbiology Lab. Culture in Progress. Received in Microbiology Lab. Culture in Progress.   GRAM STAIN RESULT  No Polys or Bacteria seen  --   --        Last 24 Hours Medication List:   Current Facility-Administered Medications   Medication Dose Route Frequency Provider Last Rate    acetaminophen  650 mg Oral Q6H PRN Mauri Scott MD      acyclovir  10 mg/kg (Ideal) Intravenous Q8H Mauri Scott  mg (04/27/24 0500)    gabapentin  300 mg Oral BID Mauri Scott MD      hydrALAZINE  10 mg Intravenous Q6H PRN Oscar Alvarenga DO      levothyroxine  25 mcg Oral Early Morning Mauri Scott MD      losartan  50 mg Oral Daily Mauri Scott MD      multi-electrolyte  75 mL/hr Intravenous Continuous Mauri Scott MD 75 mL/hr (04/27/24 6636)    pantoprazole  40 mg Oral Daily Mauri Scott MD      pravastatin  40 mg Oral Daily Mauri Scott MD      predniSONE  5 mg Oral Daily Mauri Scott MD          Today,  Patient Was Seen By: Oscar Alvarenga DO    **Please Note: This note may have been constructed using a voice recognition system.**

## 2024-04-27 NOTE — ASSESSMENT & PLAN NOTE
Patient presented to the ED with complaints of left sided facial rash that started 5 days prior to admission.  Meeting SIRS criteria, evidenced by fever, leukopenia with etiology being Varicella Zoster [VZV PCR +]  ID Consult, appreciate input  Recommending to continue with PO Valtrex, every 8 hours through 5/3  Blood cultures negative x 4 days

## 2024-04-27 NOTE — ASSESSMENT & PLAN NOTE
Patient currently on weekly Methotrexate with Prednisone utilized for flares  Currently finishing a course of prednisone

## 2024-04-27 NOTE — QUICK NOTE
Received critical value INR 5.14, on Eliquis, continue to hold eliquis for now. Possibly secondary to Sepsis. Continue to trend INR, falls precautions, bleeding precautions

## 2024-04-27 NOTE — PLAN OF CARE
Problem: Prexisting or High Potential for Compromised Skin Integrity  Goal: Skin integrity is maintained or improved  Description: INTERVENTIONS:  - Identify patients at risk for skin breakdown  - Assess and monitor skin integrity  - Assess and monitor nutrition and hydration status  - Monitor labs   - Assess for incontinence   - Turn and reposition patient  - Assist with mobility/ambulation  - Relieve pressure over bony prominences  - Avoid friction and shearing  - Provide appropriate hygiene as needed including keeping skin clean and dry  - Evaluate need for skin moisturizer/barrier cream  - Collaborate with interdisciplinary team   - Patient/family teaching  - Consider wound care consult   Outcome: Progressing     Problem: PAIN - ADULT  Goal: Verbalizes/displays adequate comfort level or baseline comfort level  Description: Interventions:  - Encourage patient to monitor pain and request assistance  - Assess pain using appropriate pain scale  - Administer analgesics based on type and severity of pain and evaluate response  - Implement non-pharmacological measures as appropriate and evaluate response  - Consider cultural and social influences on pain and pain management  - Notify physician/advanced practitioner if interventions unsuccessful or patient reports new pain  Outcome: Progressing     Problem: INFECTION - ADULT  Goal: Absence or prevention of progression during hospitalization  Description: INTERVENTIONS:  - Assess and monitor for signs and symptoms of infection  - Monitor lab/diagnostic results  - Monitor all insertion sites, i.e. indwelling lines, tubes, and drains  - Monitor endotracheal if appropriate and nasal secretions for changes in amount and color  - [place] appropriate cooling/warming therapies per order  - Administer medications as ordered  - Instruct and encourage patient and family to use good hand hygiene technique  - Identify and instruct in appropriate isolation precautions for  identified infection/condition  Outcome: Progressing  Goal: Absence of fever/infection during neutropenic period  Description: INTERVENTIONS:  - Monitor WBC    Outcome: Progressing

## 2024-04-27 NOTE — ASSESSMENT & PLAN NOTE
CT notes facial cellulitis/no discrete collection  Likely zoster  On IV acyclovir given patient's immunocompromised status [history of rheumatoid arthritis on methotrexate]  Follow-up on HSV/VZV PCR  Follow-up wound cultures/viral cultures  Per dermatology, patient can follow-up with ophthalmology outpatient/dermatology outpatient.  Okay to admit to [facility].  ID input appreciated  Contact precautions  Continue IV Acyclovir  Monitor for crusting; once it does crust, transition to PO valtrex  Monitor creat on acyclovir  Recent Labs     04/28/24  0611 04/29/24  0531 04/30/24  0551   CREATININE 0.56* 0.44* 0.51*   EGFR 87 94 89     Estimated Creatinine Clearance: 69.1 mL/min (A) (by C-G formula based on SCr of 0.51 mg/dL (L)).    Recommend zoster vaccination in 3 to 4 months.

## 2024-04-28 PROBLEM — R79.1 SUPRATHERAPEUTIC INR: Status: ACTIVE | Noted: 2024-04-28

## 2024-04-28 LAB
ALBUMIN SERPL BCP-MCNC: 3.8 G/DL (ref 3.5–5)
ALP SERPL-CCNC: 58 U/L (ref 34–104)
ALT SERPL W P-5'-P-CCNC: 11 U/L (ref 7–52)
ANION GAP SERPL CALCULATED.3IONS-SCNC: 7 MMOL/L (ref 4–13)
AST SERPL W P-5'-P-CCNC: 33 U/L (ref 13–39)
BACTERIA WND AEROBE CULT: NORMAL
BILIRUB SERPL-MCNC: 0.53 MG/DL (ref 0.2–1)
BUN SERPL-MCNC: 12 MG/DL (ref 5–25)
CALCIUM SERPL-MCNC: 8.3 MG/DL (ref 8.4–10.2)
CHLORIDE SERPL-SCNC: 101 MMOL/L (ref 96–108)
CO2 SERPL-SCNC: 29 MMOL/L (ref 21–32)
CREAT SERPL-MCNC: 0.56 MG/DL (ref 0.6–1.3)
ERYTHROCYTE [DISTWIDTH] IN BLOOD BY AUTOMATED COUNT: 14.8 % (ref 11.6–15.1)
GFR SERPL CREATININE-BSD FRML MDRD: 87 ML/MIN/1.73SQ M
GLUCOSE SERPL-MCNC: 92 MG/DL (ref 65–140)
GRAM STN SPEC: NORMAL
HCT VFR BLD AUTO: 30.2 % (ref 34.8–46.1)
HGB BLD-MCNC: 9.8 G/DL (ref 11.5–15.4)
MAGNESIUM SERPL-MCNC: 2.2 MG/DL (ref 1.9–2.7)
MCH RBC QN AUTO: 28.9 PG (ref 26.8–34.3)
MCHC RBC AUTO-ENTMCNC: 32.5 G/DL (ref 31.4–37.4)
MCV RBC AUTO: 89 FL (ref 82–98)
NRBC BLD AUTO-RTO: 0 /100 WBCS
PLATELET # BLD AUTO: 204 THOUSANDS/UL (ref 149–390)
PMV BLD AUTO: 9.2 FL (ref 8.9–12.7)
POTASSIUM SERPL-SCNC: 3.4 MMOL/L (ref 3.5–5.3)
PROT SERPL-MCNC: 7 G/DL (ref 6.4–8.4)
RBC # BLD AUTO: 3.39 MILLION/UL (ref 3.81–5.12)
SODIUM SERPL-SCNC: 137 MMOL/L (ref 135–147)
WBC # BLD AUTO: 3.77 THOUSAND/UL (ref 4.31–10.16)

## 2024-04-28 PROCEDURE — 83735 ASSAY OF MAGNESIUM: CPT | Performed by: INTERNAL MEDICINE

## 2024-04-28 PROCEDURE — 85027 COMPLETE CBC AUTOMATED: CPT | Performed by: INTERNAL MEDICINE

## 2024-04-28 PROCEDURE — 80053 COMPREHEN METABOLIC PANEL: CPT | Performed by: INTERNAL MEDICINE

## 2024-04-28 PROCEDURE — 99233 SBSQ HOSP IP/OBS HIGH 50: CPT | Performed by: INTERNAL MEDICINE

## 2024-04-28 RX ORDER — POTASSIUM CHLORIDE 20 MEQ/1
40 TABLET, EXTENDED RELEASE ORAL ONCE
Status: COMPLETED | OUTPATIENT
Start: 2024-04-28 | End: 2024-04-28

## 2024-04-28 RX ORDER — TRAMADOL HYDROCHLORIDE 50 MG/1
50 TABLET ORAL ONCE
Status: COMPLETED | OUTPATIENT
Start: 2024-04-28 | End: 2024-04-28

## 2024-04-28 RX ADMIN — PREDNISONE 5 MG: 5 TABLET ORAL at 08:08

## 2024-04-28 RX ADMIN — LEVOTHYROXINE SODIUM 25 MCG: 25 TABLET ORAL at 05:19

## 2024-04-28 RX ADMIN — PANTOPRAZOLE SODIUM 40 MG: 40 TABLET, DELAYED RELEASE ORAL at 08:08

## 2024-04-28 RX ADMIN — DICLOFENAC SODIUM 2 G: 10 GEL TOPICAL at 00:35

## 2024-04-28 RX ADMIN — DICLOFENAC SODIUM 2 G: 10 GEL TOPICAL at 08:10

## 2024-04-28 RX ADMIN — DICLOFENAC SODIUM 2 G: 10 GEL TOPICAL at 12:24

## 2024-04-28 RX ADMIN — HYDRALAZINE HYDROCHLORIDE 10 MG: 20 INJECTION INTRAMUSCULAR; INTRAVENOUS at 16:05

## 2024-04-28 RX ADMIN — ACETAMINOPHEN 650 MG: 325 TABLET, FILM COATED ORAL at 16:05

## 2024-04-28 RX ADMIN — PRAVASTATIN SODIUM 40 MG: 40 TABLET ORAL at 08:08

## 2024-04-28 RX ADMIN — TRAMADOL HYDROCHLORIDE 50 MG: 50 TABLET, COATED ORAL at 18:42

## 2024-04-28 RX ADMIN — DICLOFENAC SODIUM 2 G: 10 GEL TOPICAL at 22:51

## 2024-04-28 RX ADMIN — DICLOFENAC SODIUM 2 G: 10 GEL TOPICAL at 18:22

## 2024-04-28 RX ADMIN — ACETAMINOPHEN 650 MG: 325 TABLET, FILM COATED ORAL at 00:47

## 2024-04-28 RX ADMIN — ACYCLOVIR SODIUM 450 MG: 50 INJECTION, SOLUTION INTRAVENOUS at 14:39

## 2024-04-28 RX ADMIN — LOSARTAN POTASSIUM 50 MG: 50 TABLET, FILM COATED ORAL at 08:08

## 2024-04-28 RX ADMIN — POTASSIUM CHLORIDE 40 MEQ: 1500 TABLET, EXTENDED RELEASE ORAL at 12:23

## 2024-04-28 RX ADMIN — ACYCLOVIR SODIUM 450 MG: 50 INJECTION, SOLUTION INTRAVENOUS at 22:49

## 2024-04-28 RX ADMIN — ACYCLOVIR SODIUM 450 MG: 50 INJECTION, SOLUTION INTRAVENOUS at 06:30

## 2024-04-28 NOTE — PROGRESS NOTES
[facility]   Progress Note  Name: Ernestine Diggs I  MRN: [id number]  Unit/Bed#: -01 I Date of Admission: 4/26/2024   Date of Service: 4/28/2024 I Hospital Day: 2    * Sepsis (HCC)  Assessment & Plan  Suspected sepsis On admission due to fever/leukopenia/evidence of left-sided facial cellulitis.  Discussed with dermatology via Tiger text after sharing images, likely zoster  Swabbed lesion for HSV/VZV and sent wound/viral culture    Recent Labs     04/26/24  1117 04/27/24  0437 04/28/24  0611   WBC 3.08* 3.25* 3.77*      Blood culture sent  CT consistent with facial cellulitis/no discrete collection  In ED patient received IV Rocephin/vancomycin and IV acyclovir  Continue with IV acyclovir for zoster  IV fluids ordered  Monitor for fever/white blood cell count and rash.    Facial cellulitis  Assessment & Plan  CT notes facial cellulitis/no discrete collection  Likely zoster  On IV acyclovir given patient's immunocompromised status [history of rheumatoid arthritis on methotrexate]  Follow-up on HSV/VZV PCR  Follow-up wound cultures/viral cultures  Per dermatology, patient can follow-up with ophthalmology outpatient/dermatology outpatient.  Okay to admit to [facility].  ID input appreciated  Contact precautions  Continue IV Acyclovir  Monitor for crusting; once it does crust, transition to PO valtrex  Monitor creat on acyclovir  Recent Labs     04/26/24  1117 04/27/24  0437 04/28/24  0611   CREATININE 0.61 0.65 0.56*   EGFR 84 82 87     Estimated Creatinine Clearance: 63 mL/min (A) (by C-G formula based on SCr of 0.56 mg/dL (L)).    Recommend zoster vaccination in 3 to 4 months.    Acquired hypothyroidism  Assessment & Plan  Continue Synthroid    Primary hypertension  Assessment & Plan  Blood Pressure: 161/68   Elevated on admission  Continue Cozaar  PRN hydral for SBP > 160    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)  Assessment & Plan  On weekly  methotrexate.  Recent flare for which she was on prednisone taper [patient was currently on 5 mg daily x 5 days left in the course].        VTE Pharmacologic Prophylaxis: VTE Score: 5 High Risk (Score >/= 5) - Pharmacological DVT Prophylaxis Contraindicated. Sequential Compression Devices Ordered.    Mobility:   Basic Mobility Inpatient Raw Score: 7  JH-HLM Goal: 2: Bed activities/Dependent transfer  JH-HLM Achieved: 2: Bed activities/Dependent transfer  JH-HLM Goal achieved. Continue to encourage appropriate mobility.    Patient Centered Rounds: I performed bedside rounds with nursing staff today.  Discussions with Specialists or Other Care Team Provider:  IP CONSULT TO INFECTIOUS DISEASES      Education and Discussions with Family / Patient: patient ; attempted to call sondora but no response; left VM    Total Time Spent on Date of Encounter in care of patient: 30+ mins. This time was spent on one or more of the following: performing physical exam; counseling and coordination of care; obtaining or reviewing history; documenting in the medical record; reviewing/ordering tests, medications or procedures; communicating with other healthcare professionals and discussing with patient's family/caregivers.    Current Length of Stay: 2 day(s)  Current Patient Status: Inpatient   Certification Statement: The patient will continue to require additional inpatient hospital stay due to plan as noted above  Discharge Plan: Anticipate discharge in 48-72 hrs to discharge location to be determined pending rehab evaluations.    Code Status: Level 1 - Full Code    Subjective:   Offers no new complaints at this time. No acute events reported overnight. Understanding of plan.  All questions answered.    Objective:     Vitals:   Temp (24hrs), Av.4 °F (36.9 °C), Min:98.1 °F (36.7 °C), Max:98.7 °F (37.1 °C)    Temp:  [98.1 °F (36.7 °C)-98.7 °F (37.1 °C)] 98.4 °F (36.9 °C)  HR:  [56-65] 56  Resp:  [17-18] 17  BP: (122-165)/(54-69)  161/68  SpO2:  [97 %-99 %] 98 %  Body mass index is 26.09 kg/m².     Input and Output Summary (last 24 hours):     Intake/Output Summary (Last 24 hours) at 4/28/2024 1108  Last data filed at 4/27/2024 2311  Gross per 24 hour   Intake 1533.75 ml   Output --   Net 1533.75 ml       Physical Exam:   Physical Exam  Vitals and nursing note reviewed.   Constitutional:       General: She is not in acute distress.     Appearance: She is ill-appearing. She is not toxic-appearing.   HENT:      Head: Normocephalic.      Comments: Blisters noted on left angle of upper lip     Nose: Nose normal.      Mouth/Throat:      Mouth: Mucous membranes are dry.   Eyes:      General: No scleral icterus.     Conjunctiva/sclera: Conjunctivae normal.   Cardiovascular:      Rate and Rhythm: Normal rate.      Pulses: Normal pulses.           Radial pulses are 2+ on the right side and 2+ on the left side.      Heart sounds: Normal heart sounds.   Pulmonary:      Effort: Pulmonary effort is normal.      Breath sounds: Normal breath sounds.   Abdominal:      General: Bowel sounds are normal. There is no distension.      Palpations: Abdomen is soft.      Tenderness: There is no abdominal tenderness.   Musculoskeletal:         General: No tenderness.   Skin:     General: Skin is dry.      Coloration: Skin is not jaundiced.      Findings: Lesion (lesions begnning to crust) present.   Neurological:      Mental Status: She is alert.   Psychiatric:         Mood and Affect: Mood normal.         Behavior: Behavior normal. Behavior is cooperative.          Additional Data:     Labs:  Results from last 7 days   Lab Units 04/28/24  0611 04/27/24  1931 04/27/24  0437 04/26/24  1117   WBC Thousand/uL 3.77*  --  3.25* 3.08*   HEMOGLOBIN g/dL 9.8*   < > 10.2* 12.0   HEMATOCRIT % 30.2*   < > 31.7* 35.7   PLATELETS Thousands/uL 204  --  212 247   BANDS PCT %  --   --   --  1   SEGS PCT %  --   --  46  --    LYMPHO PCT %  --   --  35 29   MONO PCT %  --   --  18* 8    EOS PCT %  --   --  0 0    < > = values in this interval not displayed.     Results from last 7 days   Lab Units 04/28/24  0611   SODIUM mmol/L 137   POTASSIUM mmol/L 3.4*   CHLORIDE mmol/L 101   CO2 mmol/L 29   BUN mg/dL 12   CREATININE mg/dL 0.56*   ANION GAP mmol/L 7   CALCIUM mg/dL 8.3*   ALBUMIN g/dL 3.8   TOTAL BILIRUBIN mg/dL 0.53   ALK PHOS U/L 58   ALT U/L 11   AST U/L 33   GLUCOSE RANDOM mg/dL 92     Results from last 7 days   Lab Units 04/27/24  0437   INR  5.14*             Results from last 7 days   Lab Units 04/27/24  0437 04/26/24  1117   LACTIC ACID mmol/L  --  1.2   PROCALCITONIN ng/ml 0.59* 0.36*       Lines/Drains:  Invasive Devices       Peripheral Intravenous Line  Duration             Peripheral IV 04/26/24 Right Antecubital 1 day                          Imaging: Reviewed radiology reports from this admission including:   CT facial bones with contrast    Result Date: 4/26/2024  Impression: Left-sided facial cellulitis. No discrete rim-enhancing collections identified. Workstation performed: [id number]       No Chest XR results available for this patient.     No results found for this or any previous visit.      Recent Cultures (last 7 days):   Results from last 7 days   Lab Units 04/26/24  1406 04/26/24  1234 04/26/24  1117   BLOOD CULTURE   --  No Growth at 24 hrs. No Growth at 24 hrs.   GRAM STAIN RESULT  No Polys or Bacteria seen  --   --    WOUND CULTURE  Few Colonies of  --   --        Last 24 Hours Medication List:   Current Facility-Administered Medications   Medication Dose Route Frequency Provider Last Rate    acetaminophen  650 mg Oral Q6H PRN Mauri Scott MD      acyclovir  10 mg/kg (Ideal) Intravenous Q8H Mauri Scott  mg (04/28/24 0630)    Diclofenac Sodium  2 g Topical 4x Daily Judith Courtney PA-C      gabapentin  300 mg Oral BID Mauri Scott MD      hydrALAZINE  10 mg Intravenous Q6H PRN Oscar Alvarenga DO      levothyroxine  25 mcg Oral Early Morning  Mauri Scott MD      losartan  50 mg Oral Daily Mauri Scott MD      multi-electrolyte  75 mL/hr Intravenous Continuous Mauri Scott MD 75 mL/hr (04/27/24 9270)    pantoprazole  40 mg Oral Daily Mauri Scott MD      pravastatin  40 mg Oral Daily Mauri Scott MD      predniSONE  5 mg Oral Daily Mauri Scott MD          Today, Patient Was Seen By: Oscar Alvarenga DO    **Please Note: This note may have been constructed using a voice recognition system.**

## 2024-04-28 NOTE — NURSING NOTE
PCA (Song), after multiple attempts to draw Pt's morning labs was unsuccessful. Nurse notified and note created.

## 2024-04-28 NOTE — PLAN OF CARE
Problem: INFECTION - ADULT  Goal: Absence or prevention of progression during hospitalization  Description: INTERVENTIONS:  - Assess and monitor for signs and symptoms of infection  - Monitor lab/diagnostic results  - Monitor all insertion sites, i.e. indwelling lines, tubes, and drains  - Monitor endotracheal if appropriate and nasal secretions for changes in amount and color  - [place] appropriate cooling/warming therapies per order  - Administer medications as ordered  - Instruct and encourage patient and family to use good hand hygiene technique  - Identify and instruct in appropriate isolation precautions for identified infection/condition  Outcome: Progressing  Goal: Absence of fever/infection during neutropenic period  Description: INTERVENTIONS:  - Monitor WBC    Outcome: Progressing     Problem: DISCHARGE PLANNING  Goal: Discharge to home or other facility with appropriate resources  Description: INTERVENTIONS:  - Identify barriers to discharge w/patient and caregiver  - Arrange for needed discharge resources and transportation as appropriate  - Identify discharge learning needs (meds, wound care, etc.)  - Arrange for interpretive services to assist at discharge as needed  - Refer to Case Management Department for coordinating discharge planning if the patient needs post-hospital services based on physician/advanced practitioner order or complex needs related to functional status, cognitive ability, or social support system  Outcome: Progressing     Problem: Knowledge Deficit  Goal: Patient/family/caregiver demonstrates understanding of disease process, treatment plan, medications, and discharge instructions  Description: Complete learning assessment and assess knowledge base.  Interventions:  - Provide teaching at level of understanding  - Provide teaching via preferred learning methods  Outcome: Progressing

## 2024-04-29 LAB
ALBUMIN SERPL BCP-MCNC: 4 G/DL (ref 3.5–5)
ALP SERPL-CCNC: 62 U/L (ref 34–104)
ALT SERPL W P-5'-P-CCNC: 12 U/L (ref 7–52)
ANION GAP SERPL CALCULATED.3IONS-SCNC: 7 MMOL/L (ref 4–13)
ANISOCYTOSIS BLD QL SMEAR: PRESENT
AST SERPL W P-5'-P-CCNC: 34 U/L (ref 13–39)
BASOPHILS # BLD MANUAL: 0 THOUSAND/UL (ref 0–0.1)
BASOPHILS NFR MAR MANUAL: 0 % (ref 0–1)
BILIRUB SERPL-MCNC: 0.52 MG/DL (ref 0.2–1)
BUN SERPL-MCNC: 6 MG/DL (ref 5–25)
CALCIUM SERPL-MCNC: 8.7 MG/DL (ref 8.4–10.2)
CHLORIDE SERPL-SCNC: 100 MMOL/L (ref 96–108)
CO2 SERPL-SCNC: 31 MMOL/L (ref 21–32)
CREAT SERPL-MCNC: 0.44 MG/DL (ref 0.6–1.3)
DIFFERENTIAL COMMENT: ABNORMAL
EOSINOPHIL # BLD MANUAL: 0.12 THOUSAND/UL (ref 0–0.4)
EOSINOPHIL NFR BLD MANUAL: 3 % (ref 0–6)
ERYTHROCYTE [DISTWIDTH] IN BLOOD BY AUTOMATED COUNT: 14.8 % (ref 11.6–15.1)
GFR SERPL CREATININE-BSD FRML MDRD: 94 ML/MIN/1.73SQ M
GLUCOSE SERPL-MCNC: 87 MG/DL (ref 65–140)
HCT VFR BLD AUTO: 33.1 % (ref 34.8–46.1)
HGB BLD-MCNC: 10.8 G/DL (ref 11.5–15.4)
INR PPP: 1.42 (ref 0.84–1.19)
LYMPHOCYTES # BLD AUTO: 2.23 THOUSAND/UL (ref 0.6–4.47)
LYMPHOCYTES # BLD AUTO: 49 % (ref 14–44)
MAGNESIUM SERPL-MCNC: 2.1 MG/DL (ref 1.9–2.7)
MCH RBC QN AUTO: 28.8 PG (ref 26.8–34.3)
MCHC RBC AUTO-ENTMCNC: 32.6 G/DL (ref 31.4–37.4)
MCV RBC AUTO: 88 FL (ref 82–98)
MONOCYTES # BLD AUTO: 0.32 THOUSAND/UL (ref 0–1.22)
MONOCYTES NFR BLD: 8 % (ref 4–12)
NEUTROPHILS # BLD MANUAL: 1.38 THOUSAND/UL (ref 1.85–7.62)
NEUTS SEG NFR BLD AUTO: 34 % (ref 43–75)
PLATELET # BLD AUTO: 227 THOUSANDS/UL (ref 149–390)
PLATELET BLD QL SMEAR: ADEQUATE
PMV BLD AUTO: 8.9 FL (ref 8.9–12.7)
POTASSIUM SERPL-SCNC: 3.5 MMOL/L (ref 3.5–5.3)
PROT SERPL-MCNC: 7.6 G/DL (ref 6.4–8.4)
PROTHROMBIN TIME: 18.1 SECONDS (ref 11.6–14.5)
RBC # BLD AUTO: 3.75 MILLION/UL (ref 3.81–5.12)
RBC MORPH BLD: PRESENT
SODIUM SERPL-SCNC: 138 MMOL/L (ref 135–147)
VARIANT LYMPHS # BLD AUTO: 6 %
WBC # BLD AUTO: 4.06 THOUSAND/UL (ref 4.31–10.16)

## 2024-04-29 PROCEDURE — 97110 THERAPEUTIC EXERCISES: CPT

## 2024-04-29 PROCEDURE — 80053 COMPREHEN METABOLIC PANEL: CPT | Performed by: INTERNAL MEDICINE

## 2024-04-29 PROCEDURE — 97167 OT EVAL HIGH COMPLEX 60 MIN: CPT

## 2024-04-29 PROCEDURE — 97163 PT EVAL HIGH COMPLEX 45 MIN: CPT

## 2024-04-29 PROCEDURE — 99233 SBSQ HOSP IP/OBS HIGH 50: CPT | Performed by: INTERNAL MEDICINE

## 2024-04-29 PROCEDURE — 85610 PROTHROMBIN TIME: CPT | Performed by: INTERNAL MEDICINE

## 2024-04-29 PROCEDURE — 85027 COMPLETE CBC AUTOMATED: CPT | Performed by: INTERNAL MEDICINE

## 2024-04-29 PROCEDURE — 83735 ASSAY OF MAGNESIUM: CPT | Performed by: INTERNAL MEDICINE

## 2024-04-29 PROCEDURE — 85007 BL SMEAR W/DIFF WBC COUNT: CPT | Performed by: INTERNAL MEDICINE

## 2024-04-29 RX ORDER — VALACYCLOVIR HYDROCHLORIDE 500 MG/1
1000 TABLET, FILM COATED ORAL EVERY 8 HOURS SCHEDULED
Status: DISCONTINUED | OUTPATIENT
Start: 2024-04-29 | End: 2024-05-01 | Stop reason: HOSPADM

## 2024-04-29 RX ORDER — VALACYCLOVIR HYDROCHLORIDE 500 MG/1
1000 TABLET, FILM COATED ORAL EVERY 8 HOURS SCHEDULED
Status: DISCONTINUED | OUTPATIENT
Start: 2024-04-29 | End: 2024-04-29

## 2024-04-29 RX ADMIN — ACYCLOVIR SODIUM 450 MG: 50 INJECTION, SOLUTION INTRAVENOUS at 06:21

## 2024-04-29 RX ADMIN — APIXABAN 5 MG: 5 TABLET, FILM COATED ORAL at 13:48

## 2024-04-29 RX ADMIN — LEVOTHYROXINE SODIUM 25 MCG: 25 TABLET ORAL at 05:24

## 2024-04-29 RX ADMIN — VALACYCLOVIR HYDROCHLORIDE 1000 MG: 500 TABLET, FILM COATED ORAL at 17:46

## 2024-04-29 RX ADMIN — SODIUM CHLORIDE, SODIUM GLUCONATE, SODIUM ACETATE, POTASSIUM CHLORIDE, MAGNESIUM CHLORIDE, SODIUM PHOSPHATE, DIBASIC, AND POTASSIUM PHOSPHATE 75 ML/HR: .53; .5; .37; .037; .03; .012; .00082 INJECTION, SOLUTION INTRAVENOUS at 11:01

## 2024-04-29 RX ADMIN — GABAPENTIN 300 MG: 300 CAPSULE ORAL at 21:33

## 2024-04-29 RX ADMIN — HYDRALAZINE HYDROCHLORIDE 10 MG: 20 INJECTION INTRAMUSCULAR; INTRAVENOUS at 18:02

## 2024-04-29 RX ADMIN — APIXABAN 5 MG: 5 TABLET, FILM COATED ORAL at 21:33

## 2024-04-29 RX ADMIN — DICLOFENAC SODIUM 2 G: 10 GEL TOPICAL at 17:46

## 2024-04-29 RX ADMIN — PRAVASTATIN SODIUM 40 MG: 40 TABLET ORAL at 08:07

## 2024-04-29 RX ADMIN — LOSARTAN POTASSIUM 50 MG: 50 TABLET, FILM COATED ORAL at 08:07

## 2024-04-29 RX ADMIN — DICLOFENAC SODIUM 2 G: 10 GEL TOPICAL at 08:16

## 2024-04-29 RX ADMIN — PANTOPRAZOLE SODIUM 40 MG: 40 TABLET, DELAYED RELEASE ORAL at 08:07

## 2024-04-29 RX ADMIN — PREDNISONE 5 MG: 5 TABLET ORAL at 08:07

## 2024-04-29 RX ADMIN — ACETAMINOPHEN 650 MG: 325 TABLET, FILM COATED ORAL at 21:34

## 2024-04-29 RX ADMIN — DICLOFENAC SODIUM 2 G: 10 GEL TOPICAL at 21:34

## 2024-04-29 RX ADMIN — DICLOFENAC SODIUM 2 G: 10 GEL TOPICAL at 12:11

## 2024-04-29 NOTE — OCCUPATIONAL THERAPY NOTE
"          Occupational Therapy Evaluation        Patient Name: Ernestine Diggs  Today's Date: 4/29/2024 04/29/24 0832   OT Last Visit   OT Visit Date 04/29/24   Note Type   Note type Evaluation   Pain Assessment   Pain Assessment Tool 0-10   Pain Score No Pain  (8/10 recently)   Pain Location/Orientation Orientation: Bilateral;Location: Knee   Pain Radiating Towards occ raditing to/from feet   Pain Onset/Description Onset: Ongoing;Frequency: Intermittent   Patient's Stated Pain Goal No pain   Hospital Pain Intervention(s) Repositioned   Multiple Pain Sites Yes   Pain 2   Pain Score 2 7   Pain Location/Orientation 2 Orientation: Left  (hand)   Pain Onset/Description 2 Onset: Ongoing;Frequency: Constant/Continuous   Patient's Stated Pain Goal 2 No pain   Hospital Pain Intervention(s) 2 Repositioned   Restrictions/Precautions   Weight Bearing Precautions Per Order No   Braces or Orthoses   (none per pt)   Other Precautions Chair Alarm;Bed Alarm;Fall Risk;Pain;Multiple lines   Home Living   Type of Home House   Home Layout One level;Stairs to enter without rails  (ramped entrance - patients son would bump up the stairs in wheelchair)   Bathroom Shower/Tub   (sponge bathing at baseline)   Home Equipment Wheelchair-manual;Mechanical lift;Hospital bed   Additional Comments transfers from bed <> recliner. \"it's been some time since I walked or stood.\"   Prior Function   Level of [place] Needs assistance with IADLS;Needs assistance with functional mobility;Needs assistance with ADLs   Lives With Son  (+ daughter in law)   Receives Help From Family  (5 hours/day M-F home health aide. Aide assists with bathing, dressing at bed level. Pt IND with washing face, burshing teeth, feeding. HH PT/OT intermitently for sitting EOB, LE/UE TE)   IADLs Family/Friend/Other provides meals;Family/Friend/Other provides transportation;Family/Friend/Other provides medication management   Falls in the last 6 months 0  (pt denies) "   Lifestyle   Autonomy Patient lives with family in a one story house, ramped entrance - patients son would bump up the stairs in wheelchair. Patient is mostly bed bound, gets OOB via mechanical lift, has HHA 5 days per week for 5 hours.   Reciprocal Relationships Supportive Family   General   Family/Caregiver Present No   ADL   Where Assessed Edge of bed  (ADL levels based on functional performance during OT eval)   Eating Assistance 4  Minimal Assistance   Grooming Assistance 4  Minimal Assistance   UB Bathing Assistance 2  Maximal Assistance   LB Bathing Assistance 1  Total Assistance   UB Dressing Assistance 2  Maximal Assistance   LB Dressing Assistance 1  Total Assistance   Toileting Assistance  2  Maximal Assistance   Bed Mobility   Rolling R 4  Minimal assistance   Additional items Assist x 1;Increased time required;Verbal cues;LE management;HOB elevated;Bedrails   Rolling L 3  Moderate assistance   Additional items Assist x 2;HOB elevated;Bedrails;Increased time required;Verbal cues;LE management   Supine to Sit 2  Maximal assistance   Additional items Assist x 2;Increased time required;Verbal cues;LE management;HOB elevated;Bedrails   Sit to Supine 2  Maximal assistance   Additional items Assist x 2;Increased time required;Verbal cues;LE management;HOB elevated;Bedrails   Additional Comments Pt without complaints of lightheadedness, SOB, chest pain, dizziness throughout session   Transfers   Sit to Stand Unable to assess  (deferred standing due to B ankles maintained in inversion with minimal PROM and no AROM into eversion. Pt states she us unable to recall last time she stood and uses mechanical lift at baseline)   Functional Mobility   Functional Mobility 3  Moderate assistance   Additional Comments assist of 2 for positional changes   Additional items Rolling walker   Balance   Static Sitting Fair -   Dynamic Sitting Poor +   Activity Tolerance   Activity Tolerance Patient tolerated treatment well    Medical Staff Made Aware Pt seen as a co-eval with PT due to the patient's co-morbidities, clinical presentation, and present impairments which are a regression from the patient's baseline.   RUE Assessment   RUE Assessment X  (limited joint movements secondary to pain  and joint deformities Dx RA)   RUE Strength   RUE Overall Strength Deficits;Due to pain   LUE Assessment   LUE Assessment X  (limited joint movements secondary to pain and joint deformities Dx RA)   LUE Strength   LUE Overall Strength Deficits;Due to pain   Hand Function   Gross Motor Coordination Impaired   Fine Motor Coordination Impaired   Sensation   Light Touch No apparent deficits  (BUEs)   Vision-Basic Assessment   Current Vision Wears glasses all the time   Psychosocial   Psychosocial (WDL) WDL   Perception   Inattention/Neglect Appears intact   Cognition   Overall Cognitive Status WFL   Arousal/Participation Alert;Responsive;Cooperative   Attention Within functional limits   Orientation Level Oriented X4   Memory Within functional limits   Following Commands Follows all commands and directions without difficulty   Assessment   Limitation Decreased ADL status;Decreased UE ROM;Decreased UE strength;Decreased endurance;Decreased high-level ADLs;Decreased self-care trans;Decreased fine motor control   Prognosis Good   Assessment Patient is a 83 y.o. female seen for OT evaluation s/p admit to [facility] on 4/26/2024 w/Sepsis (HCC). Commorbidities affecting patient's functional performance at time of assessment include: sepsis, facial cellulitis, primary HTN, rheumatoid arthritis. Orders placed for OT evaluation and treatment.  Performed at least two patient identifiers during session including name and wristband.  Prior to admission, Patient lives with family in a one story house, ramped entrance - patients son would bump up the stairs in wheelchair. Patient is mostly bed bound, gets OOB via mechanical lift, has HHA 5 days per week  for 5 hours.  Personal factors affecting patient at time of initial evaluation include: difficulty performing ADLs. Upon evaluation, patient requires moderate and maximal assist for UB ADLs, total assist for LB ADLs.  Occupational performance is affected by the following deficits: decreased functional use of BUEs, in hand manipulation deficit with impaired reach, grasp and coordination, limited active ROM, decreased muscle strength, degenerative arthritic joint changes, impaired gross motor coordination, impaired fine motor coordination, dynamic sit/ stand balance deficit with poor standing tolerance time for self care and functional mobility, decreased activity tolerance, decreased safety awareness, increased pain, and postural control and postural alignment deficit, requiring external assistance to complete transitional movements.  Patient to benefit from continued Occupational Therapy treatment while in the hospital to address deficits as defined above and maximize level of functional independence with ADLs and functional mobility. Occupational Performance areas to address include: eating, grooming , dressing, transfer to all surfaces, functional mobility, health maintenance, IADLs: safety procedures, and IADLs: meal prep/ clean up. From OT standpoint, recommendation at time of d/c would be Level 2.   Plan   Treatment Interventions ADL retraining;UE strengthening/ROM;Endurance training;Patient/family training;Equipment evaluation/education;Fine motor coordination activities;Compensatory technique education;Continued evaluation   Goal Expiration Date 05/13/24   OT Frequency 3-5x/wk   Discharge Recommendation   Rehab Resource Intensity Level, OT II (Moderate Resource Intensity)   AM-PAC Daily Activity Inpatient   Lower Body Dressing 1   Bathing 1   Toileting 1   Upper Body Dressing 1   Grooming 2   Eating 3   Daily Activity Raw Score 9   AM-PAC Applied Cognition Inpatient   Following a Speech/Presentation 4    Understanding Ordinary Conversation 4   Taking Medications 4   Remembering Where Things Are Placed or Put Away 4   Remembering List of 4-5 Errands 4   Taking Care of Complicated Tasks 4   Applied Cognition Raw Score 24   Applied Cognition Standardized Score 62.21   Barthel Index   Feeding 5   Bathing 0   Grooming Score 0   Dressing Score 0   Bladder Score 5   Bowels Score 5   Toilet Use Score 5   Transfers (Bed/Chair) Score 5   Mobility (Level Surface) Score 0   Stairs Score 0   Barthel Index Score 25       Occupational Therapy goals:   1- Patient will complete self feeding task with set up assist  2- Patient will complete rolling to R/L with use of side rail and min assist x1.   3- Patient will increase OOB/ sitting tolerance to 2-4 hours per day for increased participation in self care and leisure tasks with no s/s of exertion.  4- Patient will verbalize and demonstrate weight shifting technique in bed and sitting position with 10% verbal cues  5- Patient will complete grooming task with set up assist. 6-Patient/ Family  will demonstrate competency with UE Home Exercise Program.  7- Patient will complete Interest checklist to explore participation in play/ leisure tasks for increased satisfaction and quality of life.

## 2024-04-29 NOTE — PLAN OF CARE
Problem: PHYSICAL THERAPY ADULT  Goal: Performs mobility at highest level of function for planned discharge setting.  See evaluation for individualized goals.  Description: Treatment/Interventions: Functional transfer training, Therapeutic exercise, Endurance training, Patient/family training, Bed mobility, Spoke to nursing, OT          See flowsheet documentation for full assessment, interventions and recommendations.  Note: Prognosis: Fair  Problem List: Decreased strength, Decreased range of motion, Decreased endurance, Impaired balance, Decreased mobility, Pain, Decreased safety awareness  Assessment: Pt is 83 y.o. female seen for high-complexity PT evaluation on 4/29/2024 s/p admit to [facility] on 4/26/2024 w/ Sepsis (HCC). PT was consulted to assess pt's functional mobility and d/c needs. Order placed for PT eval and tx, w/ up and OOB as tolerated order. PTA, pt was living with her family in a one story home with 1 SHELBI and reports assist required for ADLs, IADLs, and transfers out of bed with use of mechanical lift at baseline. At time of eval, patient completed sup <> sit maxAx2 and demonstrated fair- to good static sitting balance. Upon evaluation, pt presenting with impaired functional mobility d/t decreased strength, decreased ROM, decreased endurance, impaired balance, decreased mobility, decreased safety awareness, pain, and activity intolerance. Pertinent PMHx and current co-morbidities affecting pt's physical performance at time of assessment include: sepsis, facial cellulitis, primary HTN, rheumatoid arthritis. Personal factors affecting pt at time of eval include: stairs to enter home, inability to navigate level surfaces w/o external assistance, unable to perform physical activity, and inability to live alone. The following objective measures performed on IE also reveal limitations: AM-PAC 6-Clicks: 7/24. Pt's clinical presentation is currently unstable/unpredictable seen in pt's  presentation of advanced age, abnormal lab value(s), need for input for task focus and mobility technique, need for two person maximal assist w/ all phases of mobility when usually mobilizing independently, L hand and B knee pain impacting overall mobility status, and ongoing medical assessment. Overall, pt's rehab potential and prognosis to return to PLOF is fair as impacted by objective findings, warranting pt to receive further skilled PT interventions to address impairments, activity limitations, and participation restrictions. Pt to benefit from continued PT services to address deficits as defined above and maximize level of functional independent mobility and consistency. From PT/mobility standpoint, recommendation at time of d/c would be level 2, moderate resource intensity in order to facilitate return to PLOF.        Rehab Resource Intensity Level, PT: II (Moderate Resource Intensity)    See flowsheet documentation for full assessment.   Meredith Najera; PT, DPT

## 2024-04-29 NOTE — PLAN OF CARE
Problem: Prexisting or High Potential for Compromised Skin Integrity  Goal: Skin integrity is maintained or improved  Description: INTERVENTIONS:  - Identify patients at risk for skin breakdown  - Assess and monitor skin integrity  - Assess and monitor nutrition and hydration status  - Monitor labs   - Assess for incontinence   - Turn and reposition patient  - Assist with mobility/ambulation  - Relieve pressure over bony prominences  - Avoid friction and shearing  - Provide appropriate hygiene as needed including keeping skin clean and dry  - Evaluate need for skin moisturizer/barrier cream  - Collaborate with interdisciplinary team   - Patient/family teaching  - Consider wound care consult   Outcome: Progressing     Problem: PAIN - ADULT  Goal: Verbalizes/displays adequate comfort level or baseline comfort level  Description: Interventions:  - Encourage patient to monitor pain and request assistance  - Assess pain using appropriate pain scale  - Administer analgesics based on type and severity of pain and evaluate response  - Implement non-pharmacological measures as appropriate and evaluate response  - Consider cultural and social influences on pain and pain management  - Notify physician/advanced practitioner if interventions unsuccessful or patient reports new pain  Outcome: Progressing     Problem: INFECTION - ADULT  Goal: Absence or prevention of progression during hospitalization  Description: INTERVENTIONS:  - Assess and monitor for signs and symptoms of infection  - Monitor lab/diagnostic results  - Monitor all insertion sites, i.e. indwelling lines, tubes, and drains  - Monitor endotracheal if appropriate and nasal secretions for changes in amount and color  - [place] appropriate cooling/warming therapies per order  - Administer medications as ordered  - Instruct and encourage patient and family to use good hand hygiene technique  - Identify and instruct in appropriate isolation precautions for  identified infection/condition  Outcome: Progressing  Goal: Absence of fever/infection during neutropenic period  Description: INTERVENTIONS:  - Monitor WBC    Outcome: Progressing

## 2024-04-29 NOTE — PLAN OF CARE
Problem: OCCUPATIONAL THERAPY ADULT  Goal: Performs self-care activities at highest level of function for planned discharge setting.  See evaluation for individualized goals.  Description: Treatment Interventions: ADL retraining, UE strengthening/ROM, Endurance training, Patient/family training, Equipment evaluation/education, Fine motor coordination activities, Compensatory technique education, Continued evaluation          See flowsheet documentation for full assessment, interventions and recommendations.   Note: Limitation: Decreased ADL status, Decreased UE ROM, Decreased UE strength, Decreased endurance, Decreased high-level ADLs, Decreased self-care trans, Decreased fine motor control  Prognosis: Good  Assessment: Patient is a 83 y.o. female seen for OT evaluation s/p admit to [facility] on 4/26/2024 w/Sepsis ([facility]). Commorbidities affecting patient's functional performance at time of assessment include: sepsis, facial cellulitis, primary HTN, rheumatoid arthritis. Orders placed for OT evaluation and treatment.  Performed at least two patient identifiers during session including name and wristband.  Prior to admission, Patient lives with family in a one story house, ramped entrance - patients son would bump up the stairs in wheelchair. Patient is mostly bed bound, gets OOB via mechanical lift, has HHA 5 days per week for 5 hours.  Personal factors affecting patient at time of initial evaluation include: difficulty performing ADLs. Upon evaluation, patient requires moderate and maximal assist for UB ADLs, total assist for LB ADLs.  Occupational performance is affected by the following deficits: decreased functional use of BUEs, in hand manipulation deficit with impaired reach, grasp and coordination, limited active ROM, decreased muscle strength, degenerative arthritic joint changes, impaired gross motor coordination, impaired fine motor coordination, dynamic sit/ stand balance deficit with poor  standing tolerance time for self care and functional mobility, decreased activity tolerance, decreased safety awareness, increased pain, and postural control and postural alignment deficit, requiring external assistance to complete transitional movements.  Patient to benefit from continued Occupational Therapy treatment while in the hospital to address deficits as defined above and maximize level of functional independence with ADLs and functional mobility. Occupational Performance areas to address include: eating, grooming , dressing, transfer to all surfaces, functional mobility, health maintenance, IADLs: safety procedures, and IADLs: meal prep/ clean up. From OT standpoint, recommendation at time of d/c would be Level 2.     Rehab Resource Intensity Level, OT: II (Moderate Resource Intensity)

## 2024-04-29 NOTE — PHYSICAL THERAPY NOTE
PHYSICAL THERAPY EVALUATION  NAME:  Ernestine Diggs  DATE: 04/29/24    AGE:   83 y.o.  Mrn:   [id number]  ADMIT DX:  Rash [R21]  Facial cellulitis [L03.211]  Facial rash [R21]  Sepsis (HCC) [A41.9]  Problem List:   Patient Active Problem List   Diagnosis    Herpetic lesions    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)    Primary hypertension    Acquired hypothyroidism    Ambulatory dysfunction    Skin lesions    Nasal congestion    Insomnia    Neutropenia associated with autoimmune disease (HCC)    Sepsis (HCC)    Facial cellulitis    Supratherapeutic INR       Past Medical History  Past Medical History:   Diagnosis Date    Hypertension     Insomnia 3/18/2022    Molluscum contagiosum 3/10/2022    Nasal congestion 3/11/2022    Neutropenia associated with autoimmune disease (HCC) 3/23/2022       Past Surgical History  History reviewed. No pertinent surgical history.    Length Of Stay: 3  Performed at least 2 patient identifiers during session: Name and Birthday       04/29/24 0817   PT Last Visit   PT Visit Date 04/29/24   Note Type   Note type Evaluation   Pain Assessment   Pain Assessment Tool 0-10   Pain Score No Pain  (8/10 recently)   Pain Location/Orientation Orientation: Bilateral;Location: Knee   Pain Radiating Towards occ raditing to/from feet   Pain Onset/Description Onset: Ongoing;Frequency: Intermittent   Patient's Stated Pain Goal No pain   Hospital Pain Intervention(s) Repositioned  (RN aware)   Multiple Pain Sites Yes   Pain 2   Pain Score 2 7   Pain Location/Orientation 2 Orientation: Left  (hand)   Pain Onset/Description 2 Onset: Ongoing;Frequency: Constant/Continuous   Patient's Stated Pain Goal 2 No pain   Hospital Pain Intervention(s) 2 Repositioned   Restrictions/Precautions   Weight Bearing Precautions Per Order No   Braces or Orthoses   (none per pt)   Other Precautions Chair Alarm;Bed Alarm;Fall Risk;Pain;Multiple lines   Home Living   Type of Home House   Home  "Layout One level;Stairs to enter without rails  (ramped entrance - patients son would bump up the stairs in wheelchair)   Bathroom Shower/Tub   (sponge bathing at baseline)   Bathroom Toilet   (brief/bed pan)   Home Equipment Wheelchair-manual;Mechanical lift;Hospital bed   Additional Comments transfers from bed <> recliner. \"it's been some time since I walked or stood.\"   Prior Function   Level of [place] Needs assistance with IADLS;Needs assistance with functional mobility;Needs assistance with ADLs   Lives With Son  (+ daughter in law)   Receives Help From Family  (5 hours/day M-F home health aide. Aide assists with bathing, dressing at bed level. Pt IND with washing face, burshing teeth, feeding. HH PT/OT intermitently for sitting EOB, LE/UE TE)   IADLs Family/Friend/Other provides meals;Family/Friend/Other provides transportation;Family/Friend/Other provides medication management   Falls in the last 6 months 0  (pt denies)   General   Family/Caregiver Present No   Cognition   Overall Cognitive Status WFL   Arousal/Participation Alert   Orientation Level Oriented X4   Memory Within functional limits   Following Commands Follows all commands and directions without difficulty   Comments Pt agreeable to PT evaluation   RLE Assessment   RLE Assessment X  (ankles maintained in inversion and slighly plantarflexion at rest. minimal PROM into eversion)   Strength RLE   R Hip Flexion 2+/5   R Knee Extension 2+/5   R Ankle Dorsiflexion 2-/5   R Ankle Plantar Flexion 2-/5   LLE Assessment   LLE Assessment X  (ankles maintained in inversion and slighly plantarflexion at rest. minimal PROM into eversion)   Strength LLE   L Hip Flexion 2+/5   L Knee Extension 2+/5   L Ankle Dorsiflexion 2-/5   L Ankle Plantar Flexion 2-/5   Vision-Basic Assessment   Current Vision Wears glasses all the time   Coordination   Sensation WFL   Light Touch   RLE Light Touch Grossly intact   LLE Light Touch Grossly intact   Bed Mobility "   Rolling R 4  Minimal assistance   Additional items Assist x 1;Increased time required;Verbal cues;LE management;HOB elevated;Bedrails   Rolling L 3  Moderate assistance   Additional items Assist x 2;HOB elevated;Bedrails;Increased time required;Verbal cues;LE management   Supine to Sit 2  Maximal assistance   Additional items Assist x 2;Increased time required;Verbal cues;LE management;HOB elevated;Bedrails   Sit to Supine 2  Maximal assistance   Additional items Assist x 2;Increased time required;Verbal cues;LE management;HOB elevated;Bedrails   Additional Comments Pt without complaints of lightheadedness, SOB, chest pain, dizziness throughout session   Transfers   Sit to Stand Unable to assess   Additional Comments deferred standing due to B ankles maintained in inversion with minimal PROM and no AROM into eversion. Pt states she us unable to recall last time she stood and uses mechanical lift at baseline   Ambulation/Elevation   Gait pattern Not appropriate;Not tested   Balance   Static Sitting Fair -   Dynamic Sitting Poor +   Activity Tolerance   Activity Tolerance Patient tolerated treatment well   Medical Staff Made Aware Pt seen as a co-eval with JONATHAN Lane due to the patient's co-morbidities, clinical presentation, and present impairments which are a regression from the patient's baseline.   Nurse Made Aware RN aware   Assessment   Prognosis Fair   Problem List Decreased strength;Decreased range of motion;Decreased endurance;Impaired balance;Decreased mobility;Pain;Decreased safety awareness   Assessment Pt is 83 y.o. female seen for high-complexity PT evaluation on 4/29/2024 s/p admit to [facility] on 4/26/2024 w/ Sepsis (HCC). PT was consulted to assess pt's functional mobility and d/c needs. Order placed for PT eval and tx, w/ up and OOB as tolerated order. PTA, pt was living with her family in a one story home with 1 SHELBI and reports assist required for ADLs, IADLs, and transfers out of bed  with use of mechanical lift at baseline. At time of eval, patient completed sup <> sit maxAx2 and demonstrated fair- to good static sitting balance. Upon evaluation, pt presenting with impaired functional mobility d/t decreased strength, decreased ROM, decreased endurance, impaired balance, decreased mobility, decreased safety awareness, pain, and activity intolerance. Pertinent PMHx and current co-morbidities affecting pt's physical performance at time of assessment include: sepsis, facial cellulitis, primary HTN, rheumatoid arthritis. Personal factors affecting pt at time of eval include: stairs to enter home, inability to navigate level surfaces w/o external assistance, unable to perform physical activity, and inability to live alone. The following objective measures performed on IE also reveal limitations: AM-PAC 6-Clicks: 7/24. Pt's clinical presentation is currently unstable/unpredictable seen in pt's presentation of advanced age, abnormal lab value(s), need for input for task focus and mobility technique, need for two person maximal assist w/ all phases of mobility when usually mobilizing independently, L hand and B knee pain impacting overall mobility status, and ongoing medical assessment. Overall, pt's rehab potential and prognosis to return to PLOF is fair as impacted by objective findings, warranting pt to receive further skilled PT interventions to address impairments, activity limitations, and participation restrictions. Pt to benefit from continued PT services to address deficits as defined above and maximize level of functional independent mobility and consistency. From PT/mobility standpoint, recommendation at time of d/c would be level 2, moderate resource intensity in order to facilitate return to PLOF.   Goals   [facility] Expiration Date 05/09/24   Short Term Goal #1 In 10 days: Perform all bed mobility tasks with mod A of 1 to decrease caregiver burden, Increase static sitting and dynamic sitting  balance 1/2 grade to decrease risk for falls, Tolerate seated at EOB 20 minutes to facilitate functional task performance, and participate in mechanical lift transfers and tolerate seated in bedside chair x2 hours to increase out of bed tolerance   PT Treatment Day 0   Plan   Treatment/Interventions Functional transfer training;Therapeutic exercise;Endurance training;Patient/family training;Bed mobility;Spoke to nursing;OT   PT Frequency 3-5x/wk   Discharge Recommendation   Rehab Resource Intensity Level, PT II (Moderate Resource Intensity)   AM-PAC Basic Mobility Inpatient   Turning in Flat Bed Without Bedrails 2   Lying on Back to Sitting on Edge of Flat Bed Without Bedrails 1   Moving Bed to Chair 1   Standing Up From Chair Using Arms 1   Walk in Room 1   Climb 3-5 Stairs With Railing 1   Basic Mobility Inpatient Raw Score 7   Turning Head Towards Sound 4   Follow Simple Instructions 3   Low Function Basic Mobility Raw Score  14   Low Function Basic Mobility Standardized Score  22.01   [facility] Highest Level Of Mobility   JH-HLM Goal 2: Bed activities/Dependent transfer   JH-HLM Achieved 3: Sit at edge of bed   Additional Treatment Session       Start Time 0833   End Time 0843   Treatment Assessment Pt seen for PT treatment session this date, consisting of  therapeutic exercise focusing on LE TE and seated EOB tolerance . Pertinent barriers during this session include pain and fatigue . Pt seated EOB with Parveen to CGA required to maintain static sitting balance; pt seated X10 minutes without LOB in any direction and Spo2/HR maintained stable. Pt completed AROM B hip flexion and knee extension x5 reps each LE. Current goals and POC remain appropriate, pt continues to have rehab potential and is making good progress towards STGs. Pt prognosis for achieving goals is fair, pending pt progress with hospitalization/medical status improvements, and indicated by Stimulability, orientation, previous response to  intervention, and responsive to cues/strategies. Pt limited d/t  fatigue . PT recommends level 2, moderate resource intensity upon discharge. Pt continues to be functioning below baseline level, and remains limited 2* factors listed above. PT will continue to see pt during current hospitalization in order to address the deficits listed above and provide interventions consistent w/ POC in effort to achieve STGs.   Additional Treatment Day 1   Exercises   Hip Flexion 5 reps;Sitting;AROM   Knee AROM Long Arc Quad 5 reps;Sitting;AROM   End of Consult   Patient Position at End of Consult All needs within reach;Bed/Chair alarm activated;Supine       Time In: 0816  Time Out: 0832  Total Evaluation Minutes: 16    Time In: 0833  Time Out: 0843  Total Treatment Minutes: 10    Meredith Najera, PT

## 2024-04-29 NOTE — CASE MANAGEMENT
Case Management Assessment & Discharge Planning Note    Patient name Ernestine Diggs  Location /-01 MRN [id number]  : 1940 Date 2024       Current Admission Date: 2024  Current Admission Diagnosis:Sepsis (HCC)   Patient Active Problem List    Diagnosis Date Noted    Supratherapeutic INR 2024    Sepsis (HCC) 2024    Facial cellulitis 2024    Neutropenia associated with autoimmune disease (HCC) 2022    Insomnia 2022    Nasal congestion 2022    Skin lesions 2022    Herpetic lesions 2022    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) 2022    Primary hypertension 2022    Acquired hypothyroidism 2022    Ambulatory dysfunction 2022      LOS (days): 3  Geometric Mean LOS (GMLOS) (days):   Days to GMLOS:     OBJECTIVE:    Risk of Unplanned Readmission Score: 11.24         Current admission status: Inpatient  Referral Reason: Other (D/C planning)    Preferred Pharmacy:   [id number] - [place], [place] - [address] [address]  Phone: [phone] Fax: [phone]    Primary Care Provider: Mahogany Tai MD    Primary Insurance: MEDICARE  Secondary Insurance: AARP    ASSESSMENT:  Active Health Care Proxies    There are no active Health Care Proxies on file.       Advance Directives  Does patient have a Health Care POA?: Yes  Does patient have Advance Directives?: No  Was patient offered paperwork?: Yes  Primary Contact: Rony Diggs         Readmission Root Cause  30 Day Readmission: No    Patient Information  Admitted from:: Home  Mental Status: Alert  During Assessment patient was accompanied by: Son (CM contacted son to complete assessment)  Assessment information provided by:: Son  Primary Caregiver: Child  Caregiver's Name:: Rony Diggs  Caregiver's Relationship to Patient:: Family Member  Caregiver's Telephone Number::  [phone]  Support Systems: Son, Home care staff ([facility])  County of Residence: [place]  What city do you live in?: [place]  Home entry access options. Select all that apply.: Stairs  Number of steps to enter home.: 1  Do the steps have railings?: No  Type of Current Residence: 2 [place] home  Upon entering residence, is there a bedroom on the main floor (no further steps)?: Yes  Upon entering residence, is there a bathroom on the main floor (no further steps)?: Yes  Living Arrangements: Lives w/ Son  Is patient a ?: No    Activities of Daily Living Prior to Admission  Functional Status: Assistance  Completes ADLs independently?: No  Level of ADL dependence: Assistance  Ambulates independently?: No  Level of ambulatory dependence: Assistance  Does patient use assisted devices?: Yes  Assisted Devices (DME) used: [place] lift, Hospital Bed, Wheelchair, Walker  Does patient currently own DME?: Yes  What DME does the patient currently own?: Hospital Bed, [place] lift, Wheelchair, Walker  Does patient have a history of Outpatient Therapy (PT/OT)?: Yes  Does the patient have a history of Short-Term Rehab?: No  Does patient have a history of HHC?: Yes  Does patient currently have HHC?: Yes    Current Home Health Care  Type of Current Home Care Services: Home health aide, Home OT, Home PT, Nurse visit  Current Home Health Agency:: Other (please enter name in comment) ([facility])  Current Home Health Follow-Up Provider:: PCP    Patient Information Continued  Income Source: Pension/long term  Does patient have prescription coverage?: Yes  Does patient receive dialysis treatments?: No  Does patient have a history of substance abuse?: No  Does patient have a history of Mental Health Diagnosis?: No         Means of Transportation  Means of Transport to Appts:: Other (Comment) (Dontae corey)      Social Determinants of Health (SDOH)      Flowsheet Row Most Recent Value   Housing Stability    In the last 12  months, was there a time when you were not able to pay the mortgage or rent on time? N   In the last 12 months, how many places have you lived? 1   In the last 12 months, was there a time when you did not have a steady place to sleep or slept in a shelter (including now)? N   Transportation Needs    In the past 12 months, has lack of transportation kept you from medical appointments or from getting medications? no   In the past 12 months, has lack of transportation kept you from meetings, work, or from getting things needed for daily living? No   Food Insecurity    Within the past 12 months, you worried that your food would run out before you got the money to buy more. Never true   Within the past 12 months, the food you bought just didn't last and you didn't have money to get more. Never true   Utilities    In the past 12 months has the electric, gas, oil, or water company threatened to shut off services in your home? No            DISCHARGE DETAILS:    Discharge planning discussed with:: Pt's son  Freedom of Choice: Yes     CM contacted family/caregiver?: Yes  Were Treatment Team discharge recommendations reviewed with patient/caregiver?: Yes  Did patient/caregiver verbalize understanding of patient care needs?: Yes  Were patient/caregiver advised of the risks associated with not following Treatment Team discharge recommendations?: Yes    Contacts  Patient Contacts: Rony Diggs  Relationship to Patient:: Family  Contact Method: Phone  Phone Number: [phone]  Reason/Outcome: Continuity of Care, Discharge Planning    Requested Home Health Care         Is the patient interested in [facility] at discharge?: Yes  Home Health Discipline requested:: Nursing, Home Health Aide, Physical Therapy, Occupational Therapy  Home Health Agency Name:: Other ([facility])  Home Health Follow-Up Provider:: PCP  Home Health Services Needed:: Strengthening/Theraputic Exercises to Improve Function  Homebound Criteria Met:: Uses an  Assist Device (i.e. cane, walker, etc), Requires the Assistance of Another Person for Safe Ambulation or to Leave the Home  Supporting Clincal Findings:: Bed Bound or Wheelchair Bound, Limited Endurance    DME Referral Provided  Referral made for DME?: No    Other Referral/Resources/Interventions Provided:  Interventions: [facility]  Referral Comments: CM contacted pt's son to conduct assessment. Son reported that pt is active with [facility], and wishes to continue care. Son agreeable to ref being sent for KEARA. CM submitted referral for KEARA with [facility] via [facility]. Son reported that pt will need stretcher van at D/C. Son declined STR. CM will continue to follow.         Treatment Team Recommendation: Home with Home Health Care  Discharge Destination Plan:: Home with Home Health Care  Transport at Discharge : Stretcher van

## 2024-04-29 NOTE — PROGRESS NOTES
Progress Note - Infectious Disease   Ernestine Diggs 83 y.o. female MRN: [id number]  Unit/Bed#: -01 Encounter: [id number]      Impression/Plan:  1.  Left facial zoster, in the distribution of the left facial nerve, with Bell's palsy.  Patient has no headache or visual/auditory difficulties.  No ocular or ear lesions.  Low-grade fever is secondary to zoster.  There is no evidence of skin or cellulitis clinically.  She is clinically and systemically well, without evidence of sepsis or systemic toxicity.  Blood cultures were drawn on admission and negative.  Left facial CT is without subcutaneous abscess.  Half lesions are crusting, there are 3 remaining blisters but no new lesions noted.  -As rash now crusting and no new lesions, transition to PO Valtrex 1000 mg every 8 hours  -Plan for 7 days of anti-viral therapy, through 2024  -Recommend zoster vaccination in 3 to 4 months     2.  Leukopenia, may be all secondary to acute viral infection but also consider medication effect due to methotrexate.  -Monitor WBC     3.   Left-sided Bell's palsy, secondary to zoster.  Improving  -Monitor     4.  RA, on weekly methotrexate and intermittent prednisone for flare.  Patient currently had prednisone started.  This is risk factor for zoster above.     Above management plan to to PO Valacyclovir discussed with the SLIM Attending who is in agreement. ID will now sign off. Please call back if needed.    Antibiotics:  IV Acyclovir: 4    Subjective:  Patient doing well, denies fever or chills. Denies ocular symptoms. Pain well controlled.      Objective:  Vitals:  Temp:  [97.8 °F (36.6 °C)-98.3 °F (36.8 °C)] 98.1 °F (36.7 °C)  HR:  [61-73] 64  Resp:  [18] 18  BP: (122-169)/(50-79) 169/79  SpO2:  [97 %-98 %] 98 %  Temp (24hrs), Av.1 °F (36.7 °C), Min:97.8 °F (36.6 °C), Max:98.3 °F (36.8 °C)  Current: Temperature: 98.1 °F (36.7 °C)    Physical Exam:   General Appearance:  Alert, interactive, nontoxic, no acute distress.    Throat: Oropharynx moist without lesions.    Lungs:   Clear to auscultation bilaterally; no wheezes, rhonchi or rales; respirations unlabored   Heart:  RRR   Abdomen:   Soft, non-tender   Extremities: No clubbing, cyanosis or edema   Skin: Left facial rash now with mostly crusted lesions, three remaining blisters noted above lip       Labs:   All pertinent labs and imaging studies were personally reviewed  Results from last 7 days   Lab Units 04/29/24  0531 04/28/24  0611 04/27/24  1931 04/27/24  0437   WBC Thousand/uL 4.06* 3.77*  --  3.25*   HEMOGLOBIN g/dL 10.8* 9.8* 10.4* 10.2*   PLATELETS Thousands/uL 227 204  --  212     Results from last 7 days   Lab Units 04/29/24  0531 04/28/24  0611 04/27/24  0437 04/26/24  1117   SODIUM mmol/L 138 137 135 133*   POTASSIUM mmol/L 3.5 3.4* 3.4* 3.9   CHLORIDE mmol/L 100 101 98 96   CO2 mmol/L 31 29 27 24   BUN mg/dL 6 12 14 13   CREATININE mg/dL 0.44* 0.56* 0.65 0.61   EGFR ml/min/1.73sq m 94 87 82 84   CALCIUM mg/dL 8.7 8.3* 8.5 9.2   AST U/L 34 33  --  34   ALT U/L 12 11  --  10   ALK PHOS U/L 62 58  --  66     Results from last 7 days   Lab Units 04/27/24  0437 04/26/24  1117   PROCALCITONIN ng/ml 0.59* 0.36*                   Micro:  Results from last 7 days   Lab Units 04/26/24  1406 04/26/24  1234 04/26/24  1117   BLOOD CULTURE   --  No Growth at 72 hrs. No Growth at 72 hrs.   GRAM STAIN RESULT  No Polys or Bacteria seen  --   --    WOUND CULTURE  Few Colonies of  --   --        Imaging:          Mike Torres MD  [facility]

## 2024-04-30 LAB
ALBUMIN SERPL BCP-MCNC: 3.8 G/DL (ref 3.5–5)
ALP SERPL-CCNC: 54 U/L (ref 34–104)
ALT SERPL W P-5'-P-CCNC: 8 U/L (ref 7–52)
ANION GAP SERPL CALCULATED.3IONS-SCNC: 6 MMOL/L (ref 4–13)
AST SERPL W P-5'-P-CCNC: 28 U/L (ref 13–39)
BASOPHILS # BLD AUTO: 0.01 THOUSANDS/ÂΜL (ref 0–0.1)
BASOPHILS NFR BLD AUTO: 0 % (ref 0–1)
BILIRUB SERPL-MCNC: 0.54 MG/DL (ref 0.2–1)
BUN SERPL-MCNC: 8 MG/DL (ref 5–25)
CALCIUM SERPL-MCNC: 8.6 MG/DL (ref 8.4–10.2)
CHLORIDE SERPL-SCNC: 101 MMOL/L (ref 96–108)
CO2 SERPL-SCNC: 31 MMOL/L (ref 21–32)
CREAT SERPL-MCNC: 0.51 MG/DL (ref 0.6–1.3)
EOSINOPHIL # BLD AUTO: 0.11 THOUSAND/ÂΜL (ref 0–0.61)
EOSINOPHIL NFR BLD AUTO: 3 % (ref 0–6)
ERYTHROCYTE [DISTWIDTH] IN BLOOD BY AUTOMATED COUNT: 15 % (ref 11.6–15.1)
GFR SERPL CREATININE-BSD FRML MDRD: 89 ML/MIN/1.73SQ M
GLUCOSE SERPL-MCNC: 77 MG/DL (ref 65–140)
HCT VFR BLD AUTO: 27 % (ref 34.8–46.1)
HGB BLD-MCNC: 8.8 G/DL (ref 11.5–15.4)
IMM GRANULOCYTES # BLD AUTO: 0.01 THOUSAND/UL (ref 0–0.2)
IMM GRANULOCYTES NFR BLD AUTO: 0 % (ref 0–2)
LYMPHOCYTES # BLD AUTO: 3.16 THOUSANDS/ÂΜL (ref 0.6–4.47)
LYMPHOCYTES NFR BLD AUTO: 70 % (ref 14–44)
MAGNESIUM SERPL-MCNC: 2.1 MG/DL (ref 1.9–2.7)
MCH RBC QN AUTO: 29.3 PG (ref 26.8–34.3)
MCHC RBC AUTO-ENTMCNC: 32.6 G/DL (ref 31.4–37.4)
MCV RBC AUTO: 90 FL (ref 82–98)
MONOCYTES # BLD AUTO: 0.45 THOUSAND/ÂΜL (ref 0.17–1.22)
MONOCYTES NFR BLD AUTO: 10 % (ref 4–12)
NEUTROPHILS # BLD AUTO: 0.74 THOUSANDS/ÂΜL (ref 1.85–7.62)
NEUTS SEG NFR BLD AUTO: 17 % (ref 43–75)
NRBC BLD AUTO-RTO: 0 /100 WBCS
PLATELET # BLD AUTO: 217 THOUSANDS/UL (ref 149–390)
PMV BLD AUTO: 9.1 FL (ref 8.9–12.7)
POTASSIUM SERPL-SCNC: 3.3 MMOL/L (ref 3.5–5.3)
PROT SERPL-MCNC: 7 G/DL (ref 6.4–8.4)
RBC # BLD AUTO: 3 MILLION/UL (ref 3.81–5.12)
SODIUM SERPL-SCNC: 138 MMOL/L (ref 135–147)
VZV DNA SPEC QL NAA+PROBE: POSITIVE
WBC # BLD AUTO: 4.48 THOUSAND/UL (ref 4.31–10.16)

## 2024-04-30 PROCEDURE — 99233 SBSQ HOSP IP/OBS HIGH 50: CPT | Performed by: INTERNAL MEDICINE

## 2024-04-30 PROCEDURE — 85025 COMPLETE CBC W/AUTO DIFF WBC: CPT | Performed by: INTERNAL MEDICINE

## 2024-04-30 PROCEDURE — 80053 COMPREHEN METABOLIC PANEL: CPT | Performed by: INTERNAL MEDICINE

## 2024-04-30 PROCEDURE — 83735 ASSAY OF MAGNESIUM: CPT | Performed by: INTERNAL MEDICINE

## 2024-04-30 RX ORDER — LANOLIN ALCOHOL/MO/W.PET/CERES
3 CREAM (GRAM) TOPICAL
Status: DISCONTINUED | OUTPATIENT
Start: 2024-05-01 | End: 2024-05-01 | Stop reason: HOSPADM

## 2024-04-30 RX ADMIN — PRAVASTATIN SODIUM 40 MG: 40 TABLET ORAL at 08:56

## 2024-04-30 RX ADMIN — SODIUM CHLORIDE, SODIUM GLUCONATE, SODIUM ACETATE, POTASSIUM CHLORIDE, MAGNESIUM CHLORIDE, SODIUM PHOSPHATE, DIBASIC, AND POTASSIUM PHOSPHATE 75 ML/HR: .53; .5; .37; .037; .03; .012; .00082 INJECTION, SOLUTION INTRAVENOUS at 09:20

## 2024-04-30 RX ADMIN — APIXABAN 5 MG: 5 TABLET, FILM COATED ORAL at 22:00

## 2024-04-30 RX ADMIN — VALACYCLOVIR HYDROCHLORIDE 1000 MG: 500 TABLET, FILM COATED ORAL at 18:32

## 2024-04-30 RX ADMIN — ACETAMINOPHEN 650 MG: 325 TABLET, FILM COATED ORAL at 23:39

## 2024-04-30 RX ADMIN — DICLOFENAC SODIUM 2 G: 10 GEL TOPICAL at 18:40

## 2024-04-30 RX ADMIN — PREDNISONE 5 MG: 5 TABLET ORAL at 08:56

## 2024-04-30 RX ADMIN — LEVOTHYROXINE SODIUM 25 MCG: 25 TABLET ORAL at 05:08

## 2024-04-30 RX ADMIN — DICLOFENAC SODIUM 2 G: 10 GEL TOPICAL at 08:59

## 2024-04-30 RX ADMIN — DICLOFENAC SODIUM 2 G: 10 GEL TOPICAL at 22:00

## 2024-04-30 RX ADMIN — VALACYCLOVIR HYDROCHLORIDE 1000 MG: 500 TABLET, FILM COATED ORAL at 11:21

## 2024-04-30 RX ADMIN — PANTOPRAZOLE SODIUM 40 MG: 40 TABLET, DELAYED RELEASE ORAL at 08:56

## 2024-04-30 RX ADMIN — VALACYCLOVIR HYDROCHLORIDE 1000 MG: 500 TABLET, FILM COATED ORAL at 02:57

## 2024-04-30 RX ADMIN — LOSARTAN POTASSIUM 50 MG: 50 TABLET, FILM COATED ORAL at 08:56

## 2024-04-30 RX ADMIN — SODIUM CHLORIDE, SODIUM GLUCONATE, SODIUM ACETATE, POTASSIUM CHLORIDE, MAGNESIUM CHLORIDE, SODIUM PHOSPHATE, DIBASIC, AND POTASSIUM PHOSPHATE 75 ML/HR: .53; .5; .37; .037; .03; .012; .00082 INJECTION, SOLUTION INTRAVENOUS at 22:01

## 2024-04-30 RX ADMIN — APIXABAN 5 MG: 5 TABLET, FILM COATED ORAL at 08:56

## 2024-04-30 NOTE — PROGRESS NOTES
Patient:    MRN:  [id number]    Aidin Request ID:  [id number]    Level of care reserved:  Home Health Agency    Partner Reserved:  [facility] [place] [address] [phone]    Clinical needs requested:    Geography searched:  [phone]    Start of Service:    Request sent:  1:15pm EDT on 4/29/2024 by Dionne Blount    Partner reserved:  10:35am EDT on 4/30/2024 by Dionne Blount    Choice list shared:

## 2024-04-30 NOTE — PLAN OF CARE
Problem: Prexisting or High Potential for Compromised Skin Integrity  Goal: Skin integrity is maintained or improved  Description: INTERVENTIONS:  - Identify patients at risk for skin breakdown  - Assess and monitor skin integrity  - Assess and monitor nutrition and hydration status  - Monitor labs   - Assess for incontinence   - Turn and reposition patient  - Assist with mobility/ambulation  - Relieve pressure over bony prominences  - Avoid friction and shearing  - Provide appropriate hygiene as needed including keeping skin clean and dry  - Evaluate need for skin moisturizer/barrier cream  - Collaborate with interdisciplinary team   - Patient/family teaching  - Consider wound care consult   Outcome: Progressing     Problem: PAIN - ADULT  Goal: Verbalizes/displays adequate comfort level or baseline comfort level  Description: Interventions:  - Encourage patient to monitor pain and request assistance  - Assess pain using appropriate pain scale  - Administer analgesics based on type and severity of pain and evaluate response  - Implement non-pharmacological measures as appropriate and evaluate response  - Consider cultural and social influences on pain and pain management  - Notify physician/advanced practitioner if interventions unsuccessful or patient reports new pain  Outcome: Progressing     Problem: INFECTION - ADULT  Goal: Absence or prevention of progression during hospitalization  Description: INTERVENTIONS:  - Assess and monitor for signs and symptoms of infection  - Monitor lab/diagnostic results  - Monitor all insertion sites, i.e. indwelling lines, tubes, and drains  - Monitor endotracheal if appropriate and nasal secretions for changes in amount and color  - [place] appropriate cooling/warming therapies per order  - Administer medications as ordered  - Instruct and encourage patient and family to use good hand hygiene technique  - Identify and instruct in appropriate isolation precautions for  identified infection/condition  Outcome: Progressing  Goal: Absence of fever/infection during neutropenic period  Description: INTERVENTIONS:  - Monitor WBC    Outcome: Progressing

## 2024-04-30 NOTE — PROGRESS NOTES
Formerly [facility]   Progress Note  Name: Ernestine Diggs I  MRN: [id number]  Unit/Bed#: -01 I Date of Admission: 4/26/2024   Date of Service: 4/30/2024 I Hospital Day: 4    * Sepsis (HCC)  Assessment & Plan  Suspected sepsis On admission due to fever/leukopenia/evidence of left-sided facial cellulitis.  Discussed with dermatology via Tiger text after sharing images, likely zoster  Swabbed lesion for HSV/VZV and sent wound/viral culture    Recent Labs     04/28/24  0611 04/29/24  0531 04/30/24  0510   WBC 3.77* 4.06* 4.48      Blood culture sent  CT consistent with facial cellulitis/no discrete collection  In ED patient received IV Rocephin/vancomycin and IV acyclovir  Continue with IV acyclovir for zoster  IV fluids ordered  Monitor for fever/white blood cell count and rash.    Facial cellulitis  Assessment & Plan  CT notes facial cellulitis/no discrete collection  Likely zoster  On IV acyclovir given patient's immunocompromised status [history of rheumatoid arthritis on methotrexate]  Follow-up on HSV/VZV PCR  Follow-up wound cultures/viral cultures  Per dermatology, patient can follow-up with ophthalmology outpatient/dermatology outpatient.  Okay to admit to [facility].  ID input appreciated  Contact precautions  Continue IV Acyclovir  Monitor for crusting; once it does crust, transition to PO valtrex  Monitor creat on acyclovir  Recent Labs     04/28/24  0611 04/29/24  0531 04/30/24  0551   CREATININE 0.56* 0.44* 0.51*   EGFR 87 94 89     Estimated Creatinine Clearance: 69.1 mL/min (A) (by C-G formula based on SCr of 0.51 mg/dL (L)).    Recommend zoster vaccination in 3 to 4 months.    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  Recent flare for which she was on prednisone taper [patient was currently on 5 mg daily x 5 days left in the course].        VTE Pharmacologic Prophylaxis: VTE Score: 5 High Risk (Score >/= 5) - Pharmacological DVT Prophylaxis Ordered: apixaban (Eliquis). Sequential Compression Devices Ordered.    Mobility:   Basic Mobility Inpatient Raw Score: 7  JH-HLM Goal: 2: Bed activities/Dependent transfer  JH-HLM Achieved: 1: Laying in bed  JH-HLM Goal NOT achieved. Continue with multidisciplinary rounding and encourage appropriate mobility to improve upon -HLM goals.    Patient Centered Rounds: I performed bedside rounds with nursing staff today.  Discussions with Specialists or Other Care Team Provider:  IP CONSULT TO INFECTIOUS DISEASES      Education and Discussions with Family / Patient: patient; unable to reach son; left VM    Total Time Spent on Date of Encounter in care of patient: 30+ mins. This time was spent on one or more of the following: performing physical exam; counseling and coordination of care; obtaining or reviewing history; documenting in the medical record; reviewing/ordering tests, medications or procedures; communicating with other healthcare professionals and discussing with patient's family/caregivers.    Current Length of Stay: 4 day(s)  Current Patient Status: Inpatient   Certification Statement: The patient will continue to require additional inpatient hospital stay due to plan as noted above  Discharge Plan: Anticipate discharge tomorrow to home with home services.    Code Status: Level 1 - Full Code    Subjective:   Offers no new complaints at this time. Discussed plans for discharge tomorrow. Will need transportation from  van vs ambulance. No overnight events. Understanding of plan.  All questions answered.    Objective:     Vitals:   Temp (24hrs), Av.1 °F (36.7 °C), Min:97.9  °F (36.6 °C), Max:98.3 °F (36.8 °C)    Temp:  [97.9 °F (36.6 °C)-98.3 °F (36.8 °C)] 98.3 °F (36.8 °C)  HR:  [56-65] 63  Resp:  [16-18] 16  BP: (111-191)/(47-84) 127/64  SpO2:  [97 %-100 %] 100 %  Body mass index is 26.09 kg/m².     Input and Output Summary (last 24 hours):     Intake/Output Summary (Last 24 hours) at 4/30/2024 1507  Last data filed at 4/30/2024 0920  Gross per 24 hour   Intake 1823.75 ml   Output --   Net 1823.75 ml       Physical Exam:   Physical Exam  Vitals and nursing note reviewed.   Constitutional:       General: She is not in acute distress.     Appearance: She is ill-appearing. She is not toxic-appearing.   HENT:      Head: Normocephalic.      Comments: Blisters noted on left angle of upper lip     Nose: Nose normal.      Mouth/Throat:      Mouth: Mucous membranes are dry.   Eyes:      General: No scleral icterus.     Conjunctiva/sclera: Conjunctivae normal.   Cardiovascular:      Rate and Rhythm: Normal rate.      Pulses: Normal pulses.           Radial pulses are 2+ on the right side and 2+ on the left side.      Heart sounds: Normal heart sounds.   Pulmonary:      Effort: Pulmonary effort is normal.      Breath sounds: Normal breath sounds.   Abdominal:      General: Bowel sounds are normal. There is no distension.      Palpations: Abdomen is soft.      Tenderness: There is no abdominal tenderness.   Musculoskeletal:         General: No tenderness.   Skin:     General: Skin is dry.      Coloration: Skin is not jaundiced.      Findings: Lesion (lesions begnning to crust) present.   Neurological:      Mental Status: She is alert.   Psychiatric:         Mood and Affect: Mood normal.         Behavior: Behavior normal. Behavior is cooperative.        Additional Data:     Labs:  Results from last 7 days   Lab Units 04/30/24  0510 04/27/24  0437 04/26/24  1117   WBC Thousand/uL 4.48   < > 3.08*   HEMOGLOBIN g/dL 8.8*   < > 12.0   HEMATOCRIT % 27.0*   < > 35.7   PLATELETS Thousands/uL 217   < >  247   BANDS PCT %  --   --  1   SEGS PCT % 17*   < >  --    LYMPHO PCT % 70*   < > 29   MONO PCT % 10   < > 8   EOS PCT % 3   < > 0    < > = values in this interval not displayed.     Results from last 7 days   Lab Units 04/30/24  0551   SODIUM mmol/L 138   POTASSIUM mmol/L 3.3*   CHLORIDE mmol/L 101   CO2 mmol/L 31   BUN mg/dL 8   CREATININE mg/dL 0.51*   ANION GAP mmol/L 6   CALCIUM mg/dL 8.6   ALBUMIN g/dL 3.8   TOTAL BILIRUBIN mg/dL 0.54   ALK PHOS U/L 54   ALT U/L 8   AST U/L 28   GLUCOSE RANDOM mg/dL 77     Results from last 7 days   Lab Units 04/29/24  0531   INR  1.42*             Results from last 7 days   Lab Units 04/27/24  0437 04/26/24  1117   LACTIC ACID mmol/L  --  1.2   PROCALCITONIN ng/ml 0.59* 0.36*       Lines/Drains:  Invasive Devices       Peripheral Intravenous Line  Duration             Peripheral IV 04/30/24 Dorsal (posterior);Right Hand <1 day                          Imaging: Reviewed radiology reports from this admission including:   CT facial bones with contrast    Result Date: 4/26/2024  Impression: Left-sided facial cellulitis. No discrete rim-enhancing collections identified. Workstation performed: [id number]       No Chest XR results available for this patient.     No results found for this or any previous visit.      Recent Cultures (last 7 days):   Results from last 7 days   Lab Units 04/26/24  1406 04/26/24  1234 04/26/24  1117   BLOOD CULTURE   --  No Growth at 72 hrs. No Growth at 72 hrs.   GRAM STAIN RESULT  No Polys or Bacteria seen  --   --    WOUND CULTURE  Few Colonies of  --   --        Last 24 Hours Medication List:   Current Facility-Administered Medications   Medication Dose Route Frequency Provider Last Rate    acetaminophen  650 mg Oral Q6H PRN Mauri Scott MD      apixaban  5 mg Oral BID Oscar Alvarenga DO      Diclofenac Sodium  2 g Topical 4x Daily Judith Courtney PA-C      gabapentin  300 mg Oral BID Mauri Scott MD      hydrALAZINE  10 mg Intravenous Q6H  PRN Oscar Alvarenga DO      levothyroxine  25 mcg Oral Early Morning Mauri Scott MD      losartan  50 mg Oral Daily Mauri Scott MD      multi-electrolyte  75 mL/hr Intravenous Continuous Mauri Scott MD 75 mL/hr (04/30/24 0920)    pantoprazole  40 mg Oral Daily Mauri Scott MD      pravastatin  40 mg Oral Daily Mauri Scott MD      predniSONE  5 mg Oral Daily Mauri Scott MD      valACYclovir  1,000 mg Oral Q8H [facility] Mike Torres MD          Today, Patient Was Seen By: Oscar Alvarenga DO    **Please Note: This note may have been constructed using a voice recognition system.**

## 2024-05-01 VITALS
SYSTOLIC BLOOD PRESSURE: 168 MMHG | DIASTOLIC BLOOD PRESSURE: 82 MMHG | HEART RATE: 75 BPM | WEIGHT: 133.6 LBS | HEIGHT: 60 IN | OXYGEN SATURATION: 98 % | BODY MASS INDEX: 26.23 KG/M2 | TEMPERATURE: 98.1 F | RESPIRATION RATE: 19 BRPM

## 2024-05-01 PROBLEM — B97.89 VIRAL SEPSIS (HCC): Status: ACTIVE | Noted: 2024-04-26

## 2024-05-01 PROBLEM — A41.89 VIRAL SEPSIS (HCC): Status: ACTIVE | Noted: 2024-04-26

## 2024-05-01 LAB
BACTERIA BLD CULT: NORMAL
BACTERIA BLD CULT: NORMAL
HSV1 DNA SPEC QL NAA+PROBE: NEGATIVE
HSV2 DNA SPEC QL NAA+PROBE: NEGATIVE

## 2024-05-01 PROCEDURE — 99239 HOSP IP/OBS DSCHRG MGMT >30: CPT | Performed by: NURSE PRACTITIONER

## 2024-05-01 RX ORDER — PREDNISONE 5 MG/1
5 TABLET ORAL DAILY
Start: 2024-05-02

## 2024-05-01 RX ORDER — VALACYCLOVIR HYDROCHLORIDE 1 G/1
1000 TABLET, FILM COATED ORAL EVERY 8 HOURS SCHEDULED
Qty: 8 TABLET | Refills: 0 | Status: SHIPPED | OUTPATIENT
Start: 2024-05-01 | End: 2024-05-04

## 2024-05-01 RX ADMIN — GABAPENTIN 300 MG: 300 CAPSULE ORAL at 09:08

## 2024-05-01 RX ADMIN — LEVOTHYROXINE SODIUM 25 MCG: 25 TABLET ORAL at 05:25

## 2024-05-01 RX ADMIN — DICLOFENAC SODIUM 2 G: 10 GEL TOPICAL at 09:09

## 2024-05-01 RX ADMIN — PANTOPRAZOLE SODIUM 40 MG: 40 TABLET, DELAYED RELEASE ORAL at 09:08

## 2024-05-01 RX ADMIN — PRAVASTATIN SODIUM 40 MG: 40 TABLET ORAL at 09:09

## 2024-05-01 RX ADMIN — LOSARTAN POTASSIUM 50 MG: 50 TABLET, FILM COATED ORAL at 09:08

## 2024-05-01 RX ADMIN — Medication 3 MG: at 00:14

## 2024-05-01 RX ADMIN — VALACYCLOVIR HYDROCHLORIDE 1000 MG: 500 TABLET, FILM COATED ORAL at 09:08

## 2024-05-01 RX ADMIN — APIXABAN 5 MG: 5 TABLET, FILM COATED ORAL at 09:08

## 2024-05-01 RX ADMIN — VALACYCLOVIR HYDROCHLORIDE 1000 MG: 500 TABLET, FILM COATED ORAL at 05:25

## 2024-05-01 RX ADMIN — PREDNISONE 5 MG: 5 TABLET ORAL at 09:09

## 2024-05-01 NOTE — CASE MANAGEMENT
Case Management Discharge Planning Note    Patient name Ernestine Diggs  Location /-01 MRN [id number]  : 1940 Date 2024       Current Admission Date: 2024  Current Admission Diagnosis:Viral sepsis (HCC)   Patient Active Problem List    Diagnosis Date Noted    Supratherapeutic INR 2024    Viral sepsis (HCC) 2024    Facial cellulitis 2024    Neutropenia associated with autoimmune disease (HCC) 2022    Insomnia 2022    Nasal congestion 2022    Skin lesions 2022    Herpetic lesions 2022    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) 2022    Primary hypertension 2022    Acquired hypothyroidism 2022    Ambulatory dysfunction 2022      LOS (days): 5  Geometric Mean LOS (GMLOS) (days): 3.6  Days to GMLOS:-1.3     OBJECTIVE:  Risk of Unplanned Readmission Score: 11.56         Current admission status: Inpatient   Preferred Pharmacy:   [id number] - [place], [place] - [address] [address]  Phone: [phone] Fax: [phone]    Primary Care Provider: Mahogany Tai MD    Primary Insurance: MEDICARE  Secondary Insurance: [facility]    DISCHARGE DETAILS:    Discharge planning discussed with:: son over phone  Freedom of Choice: Yes  Comments - Freedom of Choice: CM called son to discuss time for discharge to home, son needs stretcher van for transport. Son states 3p will work because he is at his job right now. Discussed isolation precautions, son verbalized understanding.  CM contacted family/caregiver?: Yes  Were Treatment Team discharge recommendations reviewed with patient/caregiver?: Yes  Did patient/caregiver verbalize understanding of patient care needs?: Yes  Were patient/caregiver advised of the risks associated with not following Treatment Team discharge recommendations?: Yes    Contacts  Patient Contacts: Rony Diggs  Relationship to  Patient:: Family  Contact Method: Phone  Phone Number: [phone]  Reason/Outcome: Continuity of Care, Discharge Planning                                                            IMM Given (Date):: 05/01/24  IMM Given to:: Family  Family notified:: Rony Diggs, marnie  Additional Comments: IMM and Medicare rights reviewed with patient at the bedside. Patient verbalized understanding and signed original. Copy given to patient, signed original filed to medical records.

## 2024-05-01 NOTE — DISCHARGE SUMMARY
[facility]  Discharge- Ernestine Diggs 1940, 83 y.o. female MRN: [id number]  Unit/Bed#: -Enrique Encounter: [id number]  Primary Care Provider: Mahogany Tai MD   Date and time admitted to hospital: 4/26/2024 10:52 AM    * Viral sepsis (HCC)  Assessment & Plan  Patient presented to the ED with complaints of left sided facial rash that started 5 days prior to admission.  Meeting SIRS criteria, evidenced by fever, leukopenia with etiology being Varicella Zoster [VZV PCR +]  ID Consult, appreciate input  Recommending to continue with PO Valtrex, every 8 hours through 5/3  Blood cultures negative x 4 days    Supratherapeutic INR  Assessment & Plan  No need to check INR  Not on coumadin; Eliquis    Acquired hypothyroidism  Assessment & Plan  Continue Synthroid    Primary hypertension  Assessment & Plan  Blood pressures stable  Continue home medication regimen    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)  Assessment & Plan  Patient currently on weekly Methotrexate with Prednisone utilized for flares  Currently finishing a course of prednisone      Medical Problems       Resolved Problems  Date Reviewed: 5/1/2024   None       Discharging Physician / Practitioner: HAL Fenton  PCP: Mahogany Tai MD  Admission Date:   Admission Orders (From admission, onward)       Ordered        04/26/24 1449  INPATIENT ADMISSION  Once            04/26/24 1449  Inpatient Admission  Once                          Discharge Date: 05/01/24    Consultations During Hospital Stay:  IP CONSULT TO INFECTIOUS DISEASES    Procedures Performed:   None    Significant Findings / Test Results:   CT facial bones with contrast    Result Date: 4/26/2024  Left-sided facial cellulitis. No discrete rim-enhancing collections identified. Workstation performed: [id number]      Incidental Findings:   None    Test Results Pending at Discharge (will require follow up):   None      Outpatient Tests Requested:  Follow up with PCP within 1 wk    Complications:  None    Reason for Admission: Viral sepsis/facial cellulitis    Hospital Course:   Ernestine Diggs is a 83 y.o. female patient with past medical history of RA with negative RA factor, Hypothyroidism who originally presented to the hospital on 4/26/2024 due to complaints of a left-sided facial rash that began 5 days prior to admission.  Lesions initially started on her upper lip and developed to go further up her left cheek.  On arrival to the ER, patient was meeting SIRS criteria evidenced by fever, tachycardia and tachypnea.  Positive for viral sepsis from zoster infection.  Patient immunocompromise, takes methotrexate and prednisone for RA flares.    Vital signs stable, labs are stable.  Patient is feeling much better.  Patient was seen by ID who recommended Valtrex through 5/3.    Please see above list of diagnoses and related plan for additional information.     Condition at Discharge: stable    Discharge Day Visit / Exam:   Subjective:  Patient resting in bed, anxious to go home.  No complaints of chest pain, shortness of breath, fever or chills.    Vitals: Blood Pressure: 168/82 (05/01/24 1041)  Pulse: 75 (05/01/24 1041)  Temperature: 98.1 °F (36.7 °C) (05/01/24 0743)  Temp Source: Oral (04/30/24 0735)  Respirations: 19 (05/01/24 0743)  Height: 5' (152.4 cm) (04/27/24 1612)  Weight - Scale: 60.6 kg (133 lb 9.6 oz) (04/27/24 1612)  SpO2: 98 % (05/01/24 1041)    Exam:   Physical Exam  Vitals and nursing note reviewed.   Constitutional:       General: She is not in acute distress.     Appearance: She is normal weight. She is not ill-appearing.   Cardiovascular:      Rate and Rhythm: Normal rate.      Pulses: Normal pulses.   Pulmonary:      Effort: Pulmonary effort is normal.   Abdominal:      Palpations: Abdomen is soft.   Musculoskeletal:         General: Normal range of motion.   Skin:     General: Skin is warm and dry.      Findings:  Rash (Facial rash) present.   Neurological:      Mental Status: She is alert and oriented to person, place, and time.   Psychiatric:         Mood and Affect: Mood normal.          Discussion with Family:  Patient.     Discharge instructions/Information to patient and family:   See after visit summary for information provided to patient and family.      Provisions for Follow-Up Care:  See after visit summary for information related to follow-up care and any pertinent home health orders.      Mobility at time of Discharge:   Basic Mobility Inpatient Raw Score: 7  -HL Goal: 2: Bed activities/Dependent transfer  -HLM Achieved: 1: Laying in bed  HLM Goal NOT achieved. Continue to encourage mobility in post discharge setting.     Disposition:   Home    Planned Readmission: None     Discharge Statement:  I spent 38 minutes discharging the patient. This time was spent on the day of discharge. I had direct contact with the patient on the day of discharge. Greater than 50% of the total time was spent examining patient, answering all patient questions, arranging and discussing plan of care with patient as well as directly providing post-discharge instructions.  Additional time then spent on discharge activities.    Discharge Medications:  See after visit summary for reconciled discharge medications provided to patient and/or family.      **Please Note: This note may have been constructed using a voice recognition system**

## 2024-05-01 NOTE — PLAN OF CARE
Problem: INFECTION - ADULT  Goal: Absence or prevention of progression during hospitalization  Description: INTERVENTIONS:  - Assess and monitor for signs and symptoms of infection  - Monitor lab/diagnostic results  - Monitor all insertion sites, i.e. indwelling lines, tubes, and drains  - Monitor endotracheal if appropriate and nasal secretions for changes in amount and color  - [place] appropriate cooling/warming therapies per order  - Administer medications as ordered  - Instruct and encourage patient and family to use good hand hygiene technique  - Identify and instruct in appropriate isolation precautions for identified infection/condition  Outcome: Progressing